# Patient Record
Sex: FEMALE | Race: WHITE | Employment: OTHER | URBAN - NONMETROPOLITAN AREA
[De-identification: names, ages, dates, MRNs, and addresses within clinical notes are randomized per-mention and may not be internally consistent; named-entity substitution may affect disease eponyms.]

---

## 2017-01-17 ENCOUNTER — TELEPHONE (OUTPATIENT)
Dept: NEUROSURGERY | Age: 82
End: 2017-01-17

## 2017-01-25 ENCOUNTER — TELEPHONE (OUTPATIENT)
Dept: NEUROSURGERY | Age: 82
End: 2017-01-25

## 2017-02-07 ENCOUNTER — OFFICE VISIT (OUTPATIENT)
Dept: NEUROSURGERY | Age: 82
End: 2017-02-07
Payer: MEDICARE

## 2017-02-07 VITALS
BODY MASS INDEX: 24.99 KG/M2 | SYSTOLIC BLOOD PRESSURE: 146 MMHG | HEIGHT: 65 IN | DIASTOLIC BLOOD PRESSURE: 70 MMHG | WEIGHT: 150 LBS | HEART RATE: 72 BPM | OXYGEN SATURATION: 100 %

## 2017-02-07 DIAGNOSIS — Z98.890 S/P KYPHOPLASTY: ICD-10-CM

## 2017-02-07 DIAGNOSIS — M54.6 ACUTE MIDLINE THORACIC BACK PAIN: ICD-10-CM

## 2017-02-07 DIAGNOSIS — M25.551 PAIN OF RIGHT HIP JOINT: Primary | ICD-10-CM

## 2017-02-07 PROCEDURE — 99212 OFFICE O/P EST SF 10 MIN: CPT | Performed by: PHYSICIAN ASSISTANT

## 2017-02-07 RX ORDER — VITAMINS A AND D
CAPSULE ORAL
COMMUNITY
Start: 2017-01-17 | End: 2017-06-27

## 2017-02-07 RX ORDER — MELOXICAM 7.5 MG/1
7.5 TABLET ORAL DAILY
Qty: 90 TABLET | Refills: 3 | Status: SHIPPED | OUTPATIENT
Start: 2017-02-07 | End: 2017-05-25

## 2017-04-21 ENCOUNTER — HOSPITAL ENCOUNTER (OUTPATIENT)
Age: 82
Setting detail: OUTPATIENT SURGERY
Discharge: HOME | End: 2017-04-21
Attending: ORTHOPAEDIC SURGERY | Admitting: ORTHOPAEDIC SURGERY

## 2017-04-21 ENCOUNTER — HOSPITAL ENCOUNTER (OUTPATIENT)
Dept: GENERAL RADIOLOGY | Age: 82
Discharge: HOME | End: 2017-04-21
Payer: MEDICARE

## 2017-04-21 VITALS
SYSTOLIC BLOOD PRESSURE: 180 MMHG | RESPIRATION RATE: 16 BRPM | HEIGHT: 60 IN | WEIGHT: 158 LBS | BODY MASS INDEX: 31.02 KG/M2 | OXYGEN SATURATION: 100 % | DIASTOLIC BLOOD PRESSURE: 74 MMHG | HEART RATE: 83 BPM

## 2017-04-21 DIAGNOSIS — R52 PAIN: ICD-10-CM

## 2017-04-21 PROBLEM — M16.11 PRIMARY OSTEOARTHRITIS OF RIGHT HIP: Status: ACTIVE | Noted: 2017-04-21

## 2017-04-21 PROCEDURE — G8918 PT W/O PREOP ORDER IV AB PRO: HCPCS

## 2017-04-21 PROCEDURE — G8907 PT DOC NO EVENTS ON DISCHARG: HCPCS

## 2017-04-21 PROCEDURE — 3209999900 FLUORO FOR SURGICAL PROCEDURES

## 2017-04-21 PROCEDURE — 20610 DRAIN/INJ JOINT/BURSA W/O US: CPT

## 2017-04-21 RX ORDER — LIDOCAINE HYDROCHLORIDE 10 MG/ML
INJECTION, SOLUTION EPIDURAL; INFILTRATION; INTRACAUDAL; PERINEURAL PRN
Status: DISCONTINUED | OUTPATIENT
Start: 2017-04-21 | End: 2017-04-21 | Stop reason: HOSPADM

## 2017-04-21 RX ORDER — BETAMETHASONE SODIUM PHOSPHATE AND BETAMETHASONE ACETATE 3; 3 MG/ML; MG/ML
INJECTION, SUSPENSION INTRA-ARTICULAR; INTRALESIONAL; INTRAMUSCULAR; SOFT TISSUE PRN
Status: DISCONTINUED | OUTPATIENT
Start: 2017-04-21 | End: 2017-04-21 | Stop reason: HOSPADM

## 2017-04-21 RX ORDER — BUPIVACAINE HYDROCHLORIDE 5 MG/ML
INJECTION, SOLUTION EPIDURAL; INTRACAUDAL PRN
Status: DISCONTINUED | OUTPATIENT
Start: 2017-04-21 | End: 2017-04-21 | Stop reason: HOSPADM

## 2017-05-25 ENCOUNTER — OFFICE VISIT (OUTPATIENT)
Dept: VASCULAR SURGERY | Age: 82
End: 2017-05-25
Payer: MEDICARE

## 2017-05-25 VITALS
RESPIRATION RATE: 18 BRPM | HEART RATE: 65 BPM | SYSTOLIC BLOOD PRESSURE: 169 MMHG | DIASTOLIC BLOOD PRESSURE: 84 MMHG | WEIGHT: 150 LBS | BODY MASS INDEX: 28.32 KG/M2 | HEIGHT: 61 IN | TEMPERATURE: 97.3 F

## 2017-05-25 DIAGNOSIS — L03.116 CELLULITIS OF LEFT LOWER EXTREMITY: ICD-10-CM

## 2017-05-25 DIAGNOSIS — M79.89 LEG SWELLING: Primary | ICD-10-CM

## 2017-05-25 PROCEDURE — 99214 OFFICE O/P EST MOD 30 MIN: CPT | Performed by: PHYSICIAN ASSISTANT

## 2017-05-25 RX ORDER — CHLORAL HYDRATE 500 MG
3000 CAPSULE ORAL DAILY
COMMUNITY

## 2017-05-25 RX ORDER — CEPHALEXIN 250 MG/1
250 CAPSULE ORAL 3 TIMES DAILY
Qty: 30 CAPSULE | Refills: 0 | Status: SHIPPED | OUTPATIENT
Start: 2017-05-25 | End: 2017-06-13 | Stop reason: SDUPTHER

## 2017-06-13 RX ORDER — CEPHALEXIN 250 MG/1
CAPSULE ORAL
Qty: 30 CAPSULE | Refills: 0 | Status: CANCELLED | OUTPATIENT
Start: 2017-06-13

## 2017-06-13 RX ORDER — CEPHALEXIN 250 MG/1
250 CAPSULE ORAL 3 TIMES DAILY
Qty: 30 CAPSULE | Refills: 0 | Status: SHIPPED | OUTPATIENT
Start: 2017-06-13

## 2017-06-27 ENCOUNTER — APPOINTMENT (OUTPATIENT)
Dept: GENERAL RADIOLOGY | Age: 82
DRG: 470 | End: 2017-06-27
Payer: MEDICARE

## 2017-06-27 ENCOUNTER — HOSPITAL ENCOUNTER (INPATIENT)
Age: 82
LOS: 6 days | Discharge: NURSING, REHAB OR LONG-TERM CARE | DRG: 470 | End: 2017-07-03
Attending: FAMILY MEDICINE | Admitting: FAMILY MEDICINE
Payer: MEDICARE

## 2017-06-27 DIAGNOSIS — M16.11 PRIMARY OSTEOARTHRITIS OF RIGHT HIP: ICD-10-CM

## 2017-06-27 DIAGNOSIS — R52 INTRACTABLE PAIN: Primary | ICD-10-CM

## 2017-06-27 DIAGNOSIS — M25.551 PAIN OF RIGHT HIP JOINT: ICD-10-CM

## 2017-06-27 LAB
ALBUMIN SERPL-MCNC: 3.8 G/DL (ref 3.5–5.2)
ALP BLD-CCNC: 70 U/L (ref 35–104)
ALT SERPL-CCNC: 16 U/L (ref 5–33)
ANION GAP SERPL CALCULATED.3IONS-SCNC: 15 MMOL/L (ref 7–19)
AST SERPL-CCNC: 40 U/L (ref 5–32)
BILIRUB SERPL-MCNC: <0.2 MG/DL (ref 0.2–1.2)
BUN BLDV-MCNC: 13 MG/DL (ref 8–23)
CALCIUM SERPL-MCNC: 9.8 MG/DL (ref 8.8–10.2)
CHLORIDE BLD-SCNC: 101 MMOL/L (ref 98–111)
CO2: 25 MMOL/L (ref 22–29)
CREAT SERPL-MCNC: 1 MG/DL (ref 0.5–0.9)
GFR NON-AFRICAN AMERICAN: 53
GLUCOSE BLD-MCNC: 87 MG/DL (ref 74–109)
HCT VFR BLD CALC: 39.5 % (ref 37–47)
HEMOGLOBIN: 12.8 G/DL (ref 12–16)
MCH RBC QN AUTO: 28.3 PG (ref 27–31)
MCHC RBC AUTO-ENTMCNC: 32.4 G/DL (ref 33–37)
MCV RBC AUTO: 87.4 FL (ref 81–99)
PDW BLD-RTO: 19.6 % (ref 11.5–14.5)
PLATELET # BLD: 234 K/UL (ref 130–400)
PMV BLD AUTO: 9.7 FL (ref 9.4–12.3)
POTASSIUM SERPL-SCNC: 4 MMOL/L (ref 3.5–5)
RBC # BLD: 4.52 M/UL (ref 4.2–5.4)
SODIUM BLD-SCNC: 141 MMOL/L (ref 136–145)
TOTAL PROTEIN: 7.4 G/DL (ref 6.6–8.7)
WBC # BLD: 5.1 K/UL (ref 4.8–10.8)

## 2017-06-27 PROCEDURE — 73502 X-RAY EXAM HIP UNI 2-3 VIEWS: CPT

## 2017-06-27 PROCEDURE — 99285 EMERGENCY DEPT VISIT HI MDM: CPT

## 2017-06-27 PROCEDURE — 85027 COMPLETE CBC AUTOMATED: CPT

## 2017-06-27 PROCEDURE — 93005 ELECTROCARDIOGRAM TRACING: CPT

## 2017-06-27 PROCEDURE — 1210000000 HC MED SURG R&B

## 2017-06-27 PROCEDURE — 36415 COLL VENOUS BLD VENIPUNCTURE: CPT

## 2017-06-27 PROCEDURE — 80053 COMPREHEN METABOLIC PANEL: CPT

## 2017-06-27 PROCEDURE — 6370000000 HC RX 637 (ALT 250 FOR IP): Performed by: NURSE PRACTITIONER

## 2017-06-27 RX ORDER — FERROUS SULFATE 325(65) MG
325 TABLET ORAL
Status: DISCONTINUED | OUTPATIENT
Start: 2017-06-28 | End: 2017-07-03 | Stop reason: HOSPADM

## 2017-06-27 RX ORDER — DIAZEPAM 5 MG/1
5 TABLET ORAL EVERY 8 HOURS PRN
Status: DISCONTINUED | OUTPATIENT
Start: 2017-06-27 | End: 2017-06-28 | Stop reason: SDUPTHER

## 2017-06-27 RX ORDER — TRAMADOL HYDROCHLORIDE 50 MG/1
50 TABLET ORAL 2 TIMES DAILY PRN
Status: ON HOLD | COMMUNITY
End: 2017-07-03

## 2017-06-27 RX ORDER — OMEGA-3 FATTY ACIDS CAP DELAYED RELEASE 1000 MG 1000 MG
3000 CAPSULE DELAYED RELEASE ORAL 3 TIMES DAILY
Status: DISCONTINUED | OUTPATIENT
Start: 2017-06-27 | End: 2017-06-29

## 2017-06-27 RX ORDER — FERROUS SULFATE 325(65) MG
325 TABLET ORAL
COMMUNITY

## 2017-06-27 RX ORDER — CYANOCOBALAMIN (VITAMIN B-12) 1000 MCG
1 TABLET, EXTENDED RELEASE ORAL DAILY
Status: DISCONTINUED | OUTPATIENT
Start: 2017-06-28 | End: 2017-07-03 | Stop reason: HOSPADM

## 2017-06-27 RX ORDER — HYDROCODONE BITARTRATE AND ACETAMINOPHEN 5; 325 MG/1; MG/1
1 TABLET ORAL ONCE
Status: COMPLETED | OUTPATIENT
Start: 2017-06-27 | End: 2017-06-27

## 2017-06-27 RX ORDER — TRAMADOL HYDROCHLORIDE 50 MG/1
50 TABLET ORAL 2 TIMES DAILY PRN
Status: DISCONTINUED | OUTPATIENT
Start: 2017-06-27 | End: 2017-06-29

## 2017-06-27 RX ADMIN — HYDROCODONE BITARTRATE AND ACETAMINOPHEN 1 TABLET: 5; 325 TABLET ORAL at 19:57

## 2017-06-27 ASSESSMENT — ENCOUNTER SYMPTOMS: RESPIRATORY NEGATIVE: 1

## 2017-06-27 ASSESSMENT — PAIN SCALES - GENERAL
PAINLEVEL_OUTOF10: 9
PAINLEVEL_OUTOF10: 5

## 2017-06-28 ENCOUNTER — ANESTHESIA (OUTPATIENT)
Dept: OPERATING ROOM | Age: 82
DRG: 470 | End: 2017-06-28
Payer: MEDICARE

## 2017-06-28 ENCOUNTER — ANESTHESIA EVENT (OUTPATIENT)
Dept: OPERATING ROOM | Age: 82
DRG: 470 | End: 2017-06-28
Payer: MEDICARE

## 2017-06-28 ENCOUNTER — APPOINTMENT (OUTPATIENT)
Dept: GENERAL RADIOLOGY | Age: 82
DRG: 470 | End: 2017-06-28
Payer: MEDICARE

## 2017-06-28 VITALS
TEMPERATURE: 95.9 F | DIASTOLIC BLOOD PRESSURE: 50 MMHG | SYSTOLIC BLOOD PRESSURE: 123 MMHG | RESPIRATION RATE: 13 BRPM | OXYGEN SATURATION: 100 %

## 2017-06-28 LAB
ANION GAP SERPL CALCULATED.3IONS-SCNC: 10 MMOL/L (ref 7–19)
BUN BLDV-MCNC: 13 MG/DL (ref 8–23)
CALCIUM SERPL-MCNC: 9.6 MG/DL (ref 8.8–10.2)
CHLORIDE BLD-SCNC: 105 MMOL/L (ref 98–111)
CO2: 28 MMOL/L (ref 22–29)
CREAT SERPL-MCNC: 1.1 MG/DL (ref 0.5–0.9)
EKG P AXIS: 65 DEGREES
EKG P-R INTERVAL: 182 MS
EKG Q-T INTERVAL: 430 MS
EKG QRS DURATION: 82 MS
EKG QTC CALCULATION (BAZETT): 435 MS
EKG T AXIS: 13 DEGREES
GFR NON-AFRICAN AMERICAN: 47
GLUCOSE BLD-MCNC: 92 MG/DL (ref 74–109)
HCT VFR BLD CALC: 38.9 % (ref 37–47)
HEMOGLOBIN: 12.6 G/DL (ref 12–16)
INR BLD: 1.01 (ref 0.88–1.18)
MCH RBC QN AUTO: 28.4 PG (ref 27–31)
MCHC RBC AUTO-ENTMCNC: 32.4 G/DL (ref 33–37)
MCV RBC AUTO: 87.8 FL (ref 81–99)
PDW BLD-RTO: 19.7 % (ref 11.5–14.5)
PLATELET # BLD: 223 K/UL (ref 130–400)
PMV BLD AUTO: 9.9 FL (ref 9.4–12.3)
POTASSIUM SERPL-SCNC: 4 MMOL/L (ref 3.5–5)
PROTHROMBIN TIME: 13.2 SEC (ref 12–14.6)
RBC # BLD: 4.43 M/UL (ref 4.2–5.4)
SODIUM BLD-SCNC: 143 MMOL/L (ref 136–145)
WBC # BLD: 4.8 K/UL (ref 4.8–10.8)

## 2017-06-28 PROCEDURE — 2720000003 HC MISC SUTURE/STAPLES/RELOADS/ETC: Performed by: ORTHOPAEDIC SURGERY

## 2017-06-28 PROCEDURE — 85610 PROTHROMBIN TIME: CPT

## 2017-06-28 PROCEDURE — 2500000003 HC RX 250 WO HCPCS: Performed by: ORTHOPAEDIC SURGERY

## 2017-06-28 PROCEDURE — 2580000003 HC RX 258: Performed by: NURSE ANESTHETIST, CERTIFIED REGISTERED

## 2017-06-28 PROCEDURE — 2580000003 HC RX 258: Performed by: ORTHOPAEDIC SURGERY

## 2017-06-28 PROCEDURE — 6370000000 HC RX 637 (ALT 250 FOR IP): Performed by: ORTHOPAEDIC SURGERY

## 2017-06-28 PROCEDURE — 6360000002 HC RX W HCPCS: Performed by: NURSE ANESTHETIST, CERTIFIED REGISTERED

## 2017-06-28 PROCEDURE — 73502 X-RAY EXAM HIP UNI 2-3 VIEWS: CPT

## 2017-06-28 PROCEDURE — 2500000003 HC RX 250 WO HCPCS: Performed by: NURSE ANESTHETIST, CERTIFIED REGISTERED

## 2017-06-28 PROCEDURE — 72170 X-RAY EXAM OF PELVIS: CPT

## 2017-06-28 PROCEDURE — 0SR9039 REPLACEMENT OF RIGHT HIP JOINT WITH CERAMIC SYNTHETIC SUBSTITUTE, CEMENTED, OPEN APPROACH: ICD-10-PCS | Performed by: ORTHOPAEDIC SURGERY

## 2017-06-28 PROCEDURE — C1713 ANCHOR/SCREW BN/BN,TIS/BN: HCPCS | Performed by: ORTHOPAEDIC SURGERY

## 2017-06-28 PROCEDURE — 1210000000 HC MED SURG R&B

## 2017-06-28 PROCEDURE — 94664 DEMO&/EVAL PT USE INHALER: CPT

## 2017-06-28 PROCEDURE — 3700000000 HC ANESTHESIA ATTENDED CARE: Performed by: ORTHOPAEDIC SURGERY

## 2017-06-28 PROCEDURE — 6360000002 HC RX W HCPCS: Performed by: ORTHOPAEDIC SURGERY

## 2017-06-28 PROCEDURE — 3600000015 HC SURGERY LEVEL 5 ADDTL 15MIN: Performed by: ORTHOPAEDIC SURGERY

## 2017-06-28 PROCEDURE — 2700000000 HC OXYGEN THERAPY PER DAY

## 2017-06-28 PROCEDURE — 80048 BASIC METABOLIC PNL TOTAL CA: CPT

## 2017-06-28 PROCEDURE — C1776 JOINT DEVICE (IMPLANTABLE): HCPCS | Performed by: ORTHOPAEDIC SURGERY

## 2017-06-28 PROCEDURE — C1729 CATH, DRAINAGE: HCPCS | Performed by: ORTHOPAEDIC SURGERY

## 2017-06-28 PROCEDURE — 85027 COMPLETE CBC AUTOMATED: CPT

## 2017-06-28 PROCEDURE — 93005 ELECTROCARDIOGRAM TRACING: CPT

## 2017-06-28 PROCEDURE — 7100000001 HC PACU RECOVERY - ADDTL 15 MIN: Performed by: ORTHOPAEDIC SURGERY

## 2017-06-28 PROCEDURE — 7100000000 HC PACU RECOVERY - FIRST 15 MIN: Performed by: ORTHOPAEDIC SURGERY

## 2017-06-28 PROCEDURE — 3700000001 HC ADD 15 MINUTES (ANESTHESIA): Performed by: ORTHOPAEDIC SURGERY

## 2017-06-28 PROCEDURE — 36415 COLL VENOUS BLD VENIPUNCTURE: CPT

## 2017-06-28 PROCEDURE — C9290 INJ, BUPIVACAINE LIPOSOME: HCPCS | Performed by: ORTHOPAEDIC SURGERY

## 2017-06-28 PROCEDURE — 3209999900 FLUORO FOR SURGICAL PROCEDURES

## 2017-06-28 PROCEDURE — 2720000001 HC MISC SURG SUPPLY STERILE $51-500: Performed by: ORTHOPAEDIC SURGERY

## 2017-06-28 PROCEDURE — 99232 SBSQ HOSP IP/OBS MODERATE 35: CPT | Performed by: HOSPITALIST

## 2017-06-28 PROCEDURE — 3600000005 HC SURGERY LEVEL 5 BASE: Performed by: ORTHOPAEDIC SURGERY

## 2017-06-28 PROCEDURE — 6370000000 HC RX 637 (ALT 250 FOR IP): Performed by: CLINICAL NURSE SPECIALIST

## 2017-06-28 DEVICE — CENTRALIZER STEM SZ 5 L120MM DIA13MM DST FEM HIP CEM MOLD: Type: IMPLANTABLE DEVICE | Site: HIP | Status: FUNCTIONAL

## 2017-06-28 DEVICE — LINER ACET OD56MM ID36MM HIP ALTRX PINN: Type: IMPLANTABLE DEVICE | Site: HIP | Status: FUNCTIONAL

## 2017-06-28 DEVICE — IMPLANTABLE DEVICE: Type: IMPLANTABLE DEVICE | Site: HIP | Status: FUNCTIONAL

## 2017-06-28 DEVICE — SCREW BNE L45MM DIA6.5MM CANC HIP S STL GRIPTION FULL THRD: Type: IMPLANTABLE DEVICE | Site: HIP | Status: FUNCTIONAL

## 2017-06-28 DEVICE — SCREW BNE L30MM DIA6.5MM CANC HIP S STL GRIPTION FULL THRD: Type: IMPLANTABLE DEVICE | Site: HIP | Status: FUNCTIONAL

## 2017-06-28 DEVICE — DISCONTINUED USE 415964 CEMENT PALACOS R + G SING DOSE 40GR: Type: IMPLANTABLE DEVICE | Site: HIP | Status: FUNCTIONAL

## 2017-06-28 DEVICE — CEMENT BNE 20ML 40GM HALF DOSE PMMA W/O GENT HI VISC RADPQ: Type: IMPLANTABLE DEVICE | Site: HIP | Status: FUNCTIONAL

## 2017-06-28 DEVICE — CUP ACET DIA56MM 12 H HIP TI GRIPTION MULT H II VIP TAPR: Type: IMPLANTABLE DEVICE | Site: HIP | Status: FUNCTIONAL

## 2017-06-28 DEVICE — HEAD FEM DIA36MM +1.5MM OFFSET 12/14 TAPR HIP MTL M-SPEC: Type: IMPLANTABLE DEVICE | Site: HIP | Status: FUNCTIONAL

## 2017-06-28 RX ORDER — ONDANSETRON 2 MG/ML
INJECTION INTRAMUSCULAR; INTRAVENOUS PRN
Status: DISCONTINUED | OUTPATIENT
Start: 2017-06-28 | End: 2017-06-28 | Stop reason: SDUPTHER

## 2017-06-28 RX ORDER — MORPHINE SULFATE 4 MG/ML
4 INJECTION, SOLUTION INTRAMUSCULAR; INTRAVENOUS EVERY 5 MIN PRN
Status: DISCONTINUED | OUTPATIENT
Start: 2017-06-28 | End: 2017-06-28 | Stop reason: HOSPADM

## 2017-06-28 RX ORDER — ONDANSETRON 2 MG/ML
4 INJECTION INTRAMUSCULAR; INTRAVENOUS EVERY 6 HOURS PRN
Status: DISCONTINUED | OUTPATIENT
Start: 2017-06-28 | End: 2017-07-03 | Stop reason: HOSPADM

## 2017-06-28 RX ORDER — DOCUSATE SODIUM 100 MG/1
100 CAPSULE, LIQUID FILLED ORAL 2 TIMES DAILY
Status: DISCONTINUED | OUTPATIENT
Start: 2017-06-28 | End: 2017-07-03 | Stop reason: HOSPADM

## 2017-06-28 RX ORDER — SODIUM CHLORIDE 0.9 % (FLUSH) 0.9 %
10 SYRINGE (ML) INJECTION PRN
Status: DISCONTINUED | OUTPATIENT
Start: 2017-06-28 | End: 2017-06-28 | Stop reason: SDUPTHER

## 2017-06-28 RX ORDER — SODIUM CHLORIDE 9 MG/ML
INJECTION, SOLUTION INTRAVENOUS CONTINUOUS
Status: DISCONTINUED | OUTPATIENT
Start: 2017-06-28 | End: 2017-06-28 | Stop reason: SDUPTHER

## 2017-06-28 RX ORDER — OXYCODONE HYDROCHLORIDE 5 MG/1
10 TABLET ORAL EVERY 4 HOURS PRN
Status: DISCONTINUED | OUTPATIENT
Start: 2017-06-28 | End: 2017-07-03 | Stop reason: HOSPADM

## 2017-06-28 RX ORDER — LABETALOL HYDROCHLORIDE 5 MG/ML
5 INJECTION, SOLUTION INTRAVENOUS EVERY 10 MIN PRN
Status: DISCONTINUED | OUTPATIENT
Start: 2017-06-28 | End: 2017-06-28 | Stop reason: HOSPADM

## 2017-06-28 RX ORDER — CEFAZOLIN SODIUM 1 G/3ML
INJECTION, POWDER, FOR SOLUTION INTRAMUSCULAR; INTRAVENOUS PRN
Status: DISCONTINUED | OUTPATIENT
Start: 2017-06-28 | End: 2017-06-28 | Stop reason: SDUPTHER

## 2017-06-28 RX ORDER — HYDRALAZINE HYDROCHLORIDE 20 MG/ML
5 INJECTION INTRAMUSCULAR; INTRAVENOUS EVERY 10 MIN PRN
Status: DISCONTINUED | OUTPATIENT
Start: 2017-06-28 | End: 2017-06-28 | Stop reason: HOSPADM

## 2017-06-28 RX ORDER — ROCURONIUM BROMIDE 10 MG/ML
INJECTION, SOLUTION INTRAVENOUS PRN
Status: DISCONTINUED | OUTPATIENT
Start: 2017-06-28 | End: 2017-06-28 | Stop reason: SDUPTHER

## 2017-06-28 RX ORDER — DIPHENHYDRAMINE HYDROCHLORIDE 50 MG/ML
12.5 INJECTION INTRAMUSCULAR; INTRAVENOUS
Status: DISCONTINUED | OUTPATIENT
Start: 2017-06-28 | End: 2017-06-28 | Stop reason: HOSPADM

## 2017-06-28 RX ORDER — EPHEDRINE SULFATE 50 MG/ML
INJECTION, SOLUTION INTRAVENOUS PRN
Status: DISCONTINUED | OUTPATIENT
Start: 2017-06-28 | End: 2017-06-28 | Stop reason: SDUPTHER

## 2017-06-28 RX ORDER — MORPHINE SULFATE 4 MG/ML
2 INJECTION, SOLUTION INTRAMUSCULAR; INTRAVENOUS EVERY 5 MIN PRN
Status: DISCONTINUED | OUTPATIENT
Start: 2017-06-28 | End: 2017-06-28 | Stop reason: HOSPADM

## 2017-06-28 RX ORDER — ENALAPRILAT 2.5 MG/2ML
1.25 INJECTION INTRAVENOUS
Status: DISCONTINUED | OUTPATIENT
Start: 2017-06-28 | End: 2017-06-28 | Stop reason: HOSPADM

## 2017-06-28 RX ORDER — SODIUM CHLORIDE, SODIUM LACTATE, POTASSIUM CHLORIDE, CALCIUM CHLORIDE 600; 310; 30; 20 MG/100ML; MG/100ML; MG/100ML; MG/100ML
INJECTION, SOLUTION INTRAVENOUS CONTINUOUS PRN
Status: DISCONTINUED | OUTPATIENT
Start: 2017-06-28 | End: 2017-06-28 | Stop reason: SDUPTHER

## 2017-06-28 RX ORDER — LIDOCAINE HYDROCHLORIDE 10 MG/ML
INJECTION, SOLUTION INFILTRATION; PERINEURAL PRN
Status: DISCONTINUED | OUTPATIENT
Start: 2017-06-28 | End: 2017-06-28 | Stop reason: SDUPTHER

## 2017-06-28 RX ORDER — CLINDAMYCIN PHOSPHATE 900 MG/50ML
900 INJECTION INTRAVENOUS EVERY 8 HOURS
Status: DISCONTINUED | OUTPATIENT
Start: 2017-06-28 | End: 2017-06-29

## 2017-06-28 RX ORDER — DIPHENHYDRAMINE HCL 25 MG
25 TABLET ORAL EVERY 6 HOURS PRN
Status: DISCONTINUED | OUTPATIENT
Start: 2017-06-28 | End: 2017-07-03 | Stop reason: HOSPADM

## 2017-06-28 RX ORDER — TRAMADOL HYDROCHLORIDE 50 MG/1
50 TABLET ORAL EVERY 6 HOURS
Status: DISCONTINUED | OUTPATIENT
Start: 2017-06-28 | End: 2017-06-29

## 2017-06-28 RX ORDER — DOCUSATE SODIUM 100 MG/1
100 CAPSULE, LIQUID FILLED ORAL 2 TIMES DAILY
Status: DISCONTINUED | OUTPATIENT
Start: 2017-06-28 | End: 2017-06-28 | Stop reason: SDUPTHER

## 2017-06-28 RX ORDER — ACETAMINOPHEN 500 MG
1000 TABLET ORAL EVERY 8 HOURS
Status: DISCONTINUED | OUTPATIENT
Start: 2017-06-28 | End: 2017-07-03 | Stop reason: HOSPADM

## 2017-06-28 RX ORDER — METOCLOPRAMIDE HYDROCHLORIDE 5 MG/ML
10 INJECTION INTRAMUSCULAR; INTRAVENOUS
Status: DISCONTINUED | OUTPATIENT
Start: 2017-06-28 | End: 2017-06-28 | Stop reason: HOSPADM

## 2017-06-28 RX ORDER — OXYCODONE HYDROCHLORIDE 5 MG/1
5 TABLET ORAL EVERY 4 HOURS PRN
Status: DISCONTINUED | OUTPATIENT
Start: 2017-06-28 | End: 2017-07-03 | Stop reason: HOSPADM

## 2017-06-28 RX ORDER — PROPOFOL 10 MG/ML
INJECTION, EMULSION INTRAVENOUS PRN
Status: DISCONTINUED | OUTPATIENT
Start: 2017-06-28 | End: 2017-06-28 | Stop reason: SDUPTHER

## 2017-06-28 RX ORDER — DIAZEPAM 5 MG/1
5 TABLET ORAL EVERY 6 HOURS PRN
Status: DISCONTINUED | OUTPATIENT
Start: 2017-06-28 | End: 2017-07-03 | Stop reason: HOSPADM

## 2017-06-28 RX ORDER — SODIUM CHLORIDE 0.9 % (FLUSH) 0.9 %
10 SYRINGE (ML) INJECTION EVERY 12 HOURS SCHEDULED
Status: DISCONTINUED | OUTPATIENT
Start: 2017-06-28 | End: 2017-07-03 | Stop reason: HOSPADM

## 2017-06-28 RX ORDER — DEXAMETHASONE SODIUM PHOSPHATE 4 MG/ML
INJECTION, SOLUTION INTRA-ARTICULAR; INTRALESIONAL; INTRAMUSCULAR; INTRAVENOUS; SOFT TISSUE PRN
Status: DISCONTINUED | OUTPATIENT
Start: 2017-06-28 | End: 2017-06-28 | Stop reason: SDUPTHER

## 2017-06-28 RX ORDER — PROMETHAZINE HYDROCHLORIDE 25 MG/ML
6.25 INJECTION, SOLUTION INTRAMUSCULAR; INTRAVENOUS
Status: DISCONTINUED | OUTPATIENT
Start: 2017-06-28 | End: 2017-06-28 | Stop reason: HOSPADM

## 2017-06-28 RX ORDER — FAMOTIDINE 20 MG/1
20 TABLET, FILM COATED ORAL 2 TIMES DAILY
Status: DISCONTINUED | OUTPATIENT
Start: 2017-06-28 | End: 2017-06-30 | Stop reason: DRUGHIGH

## 2017-06-28 RX ORDER — FENTANYL CITRATE 50 UG/ML
INJECTION, SOLUTION INTRAMUSCULAR; INTRAVENOUS PRN
Status: DISCONTINUED | OUTPATIENT
Start: 2017-06-28 | End: 2017-06-28 | Stop reason: SDUPTHER

## 2017-06-28 RX ORDER — SODIUM CHLORIDE 9 MG/ML
INJECTION, SOLUTION INTRAVENOUS CONTINUOUS
Status: DISCONTINUED | OUTPATIENT
Start: 2017-06-28 | End: 2017-07-03 | Stop reason: HOSPADM

## 2017-06-28 RX ORDER — MEPERIDINE HYDROCHLORIDE 50 MG/ML
12.5 INJECTION INTRAMUSCULAR; INTRAVENOUS; SUBCUTANEOUS EVERY 5 MIN PRN
Status: DISCONTINUED | OUTPATIENT
Start: 2017-06-28 | End: 2017-06-28 | Stop reason: HOSPADM

## 2017-06-28 RX ORDER — SODIUM CHLORIDE 0.9 % (FLUSH) 0.9 %
10 SYRINGE (ML) INJECTION PRN
Status: DISCONTINUED | OUTPATIENT
Start: 2017-06-28 | End: 2017-07-03 | Stop reason: HOSPADM

## 2017-06-28 RX ORDER — ONDANSETRON 2 MG/ML
4 INJECTION INTRAMUSCULAR; INTRAVENOUS EVERY 6 HOURS PRN
Status: DISCONTINUED | OUTPATIENT
Start: 2017-06-28 | End: 2017-06-28 | Stop reason: SDUPTHER

## 2017-06-28 RX ORDER — 0.9 % SODIUM CHLORIDE 0.9 %
500 INTRAVENOUS SOLUTION INTRAVENOUS PRN
Status: DISCONTINUED | OUTPATIENT
Start: 2017-06-28 | End: 2017-07-03 | Stop reason: HOSPADM

## 2017-06-28 RX ORDER — ACETAMINOPHEN 325 MG/1
650 TABLET ORAL EVERY 4 HOURS PRN
Status: DISCONTINUED | OUTPATIENT
Start: 2017-06-28 | End: 2017-07-03 | Stop reason: HOSPADM

## 2017-06-28 RX ORDER — TRANEXAMIC ACID 100 MG/ML
INJECTION, SOLUTION INTRAVENOUS PRN
Status: DISCONTINUED | OUTPATIENT
Start: 2017-06-28 | End: 2017-06-28 | Stop reason: SDUPTHER

## 2017-06-28 RX ORDER — SODIUM CHLORIDE 0.9 % (FLUSH) 0.9 %
10 SYRINGE (ML) INJECTION EVERY 12 HOURS SCHEDULED
Status: DISCONTINUED | OUTPATIENT
Start: 2017-06-28 | End: 2017-06-28 | Stop reason: SDUPTHER

## 2017-06-28 RX ADMIN — SODIUM CHLORIDE, SODIUM LACTATE, POTASSIUM CHLORIDE, AND CALCIUM CHLORIDE: 600; 310; 30; 20 INJECTION, SOLUTION INTRAVENOUS at 14:51

## 2017-06-28 RX ADMIN — EPHEDRINE SULFATE 5 MG: 50 INJECTION, SOLUTION INTRAMUSCULAR; INTRAVENOUS; SUBCUTANEOUS at 15:23

## 2017-06-28 RX ADMIN — TRAMADOL HYDROCHLORIDE 50 MG: 50 TABLET, FILM COATED ORAL at 21:28

## 2017-06-28 RX ADMIN — CLINDAMYCIN PHOSPHATE 900 MG: 18 INJECTION, SOLUTION INTRAVENOUS at 21:30

## 2017-06-28 RX ADMIN — PROPOFOL 130 MG: 10 INJECTION, EMULSION INTRAVENOUS at 14:59

## 2017-06-28 RX ADMIN — FENTANYL CITRATE 50 MCG: 50 INJECTION INTRAMUSCULAR; INTRAVENOUS at 15:30

## 2017-06-28 RX ADMIN — SODIUM CHLORIDE, SODIUM LACTATE, POTASSIUM CHLORIDE, AND CALCIUM CHLORIDE: 600; 310; 30; 20 INJECTION, SOLUTION INTRAVENOUS at 17:07

## 2017-06-28 RX ADMIN — ONDANSETRON HYDROCHLORIDE 4 MG: 2 INJECTION, SOLUTION INTRAVENOUS at 16:36

## 2017-06-28 RX ADMIN — TRANEXAMIC ACID 1000 MG: 100 INJECTION, SOLUTION INTRAVENOUS at 16:49

## 2017-06-28 RX ADMIN — MORPHINE SULFATE 4 MG: 4 INJECTION, SOLUTION INTRAMUSCULAR; INTRAVENOUS at 17:53

## 2017-06-28 RX ADMIN — HYDROMORPHONE HYDROCHLORIDE 0.5 MG: 1 INJECTION, SOLUTION INTRAMUSCULAR; INTRAVENOUS; SUBCUTANEOUS at 16:26

## 2017-06-28 RX ADMIN — EPHEDRINE SULFATE 10 MG: 50 INJECTION, SOLUTION INTRAMUSCULAR; INTRAVENOUS; SUBCUTANEOUS at 15:17

## 2017-06-28 RX ADMIN — FENTANYL CITRATE 50 MCG: 50 INJECTION INTRAMUSCULAR; INTRAVENOUS at 14:52

## 2017-06-28 RX ADMIN — EPHEDRINE SULFATE 35 MG: 50 INJECTION, SOLUTION INTRAMUSCULAR; INTRAVENOUS; SUBCUTANEOUS at 16:30

## 2017-06-28 RX ADMIN — TRANEXAMIC ACID 1000 MG: 100 INJECTION, SOLUTION INTRAVENOUS at 15:19

## 2017-06-28 RX ADMIN — FENTANYL CITRATE 100 MCG: 50 INJECTION INTRAMUSCULAR; INTRAVENOUS at 14:58

## 2017-06-28 RX ADMIN — Medication 2 G: at 22:45

## 2017-06-28 RX ADMIN — FAMOTIDINE 20 MG: 20 TABLET ORAL at 22:45

## 2017-06-28 RX ADMIN — SUGAMMADEX 130 MG: 100 INJECTION, SOLUTION INTRAVENOUS at 16:48

## 2017-06-28 RX ADMIN — LIDOCAINE HYDROCHLORIDE 5 ML: 10 INJECTION, SOLUTION INFILTRATION; PERINEURAL at 14:59

## 2017-06-28 RX ADMIN — DOCUSATE SODIUM 100 MG: 100 CAPSULE, LIQUID FILLED ORAL at 21:28

## 2017-06-28 RX ADMIN — SODIUM CHLORIDE: 9 INJECTION, SOLUTION INTRAVENOUS at 21:30

## 2017-06-28 RX ADMIN — CEFAZOLIN 2000 MG: 1 INJECTION, POWDER, FOR SOLUTION INTRAVENOUS at 15:10

## 2017-06-28 RX ADMIN — ONDANSETRON 4 MG: 2 INJECTION INTRAMUSCULAR; INTRAVENOUS at 20:25

## 2017-06-28 RX ADMIN — ACETAMINOPHEN 1000 MG: 500 TABLET ORAL at 21:29

## 2017-06-28 RX ADMIN — ROCURONIUM BROMIDE 50 MG: 10 INJECTION INTRAVENOUS at 14:59

## 2017-06-28 RX ADMIN — HYDROMORPHONE HYDROCHLORIDE 0.5 MG: 1 INJECTION, SOLUTION INTRAMUSCULAR; INTRAVENOUS; SUBCUTANEOUS at 16:14

## 2017-06-28 RX ADMIN — DEXAMETHASONE SODIUM PHOSPHATE 4 MG: 4 INJECTION, SOLUTION INTRAMUSCULAR; INTRAVENOUS at 15:20

## 2017-06-28 ASSESSMENT — ENCOUNTER SYMPTOMS
BACK PAIN: 1
RESPIRATORY NEGATIVE: 1
EYES NEGATIVE: 1
GASTROINTESTINAL NEGATIVE: 1

## 2017-06-28 ASSESSMENT — PAIN SCALES - GENERAL: PAINLEVEL_OUTOF10: 5

## 2017-06-29 LAB
ANION GAP SERPL CALCULATED.3IONS-SCNC: 16 MMOL/L (ref 7–19)
BUN BLDV-MCNC: 13 MG/DL (ref 8–23)
CALCIUM SERPL-MCNC: 8.6 MG/DL (ref 8.8–10.2)
CHLORIDE BLD-SCNC: 101 MMOL/L (ref 98–111)
CO2: 20 MMOL/L (ref 22–29)
CREAT SERPL-MCNC: 1 MG/DL (ref 0.5–0.9)
GFR NON-AFRICAN AMERICAN: 53
GLUCOSE BLD-MCNC: 195 MG/DL (ref 74–109)
HCT VFR BLD CALC: 36.2 % (ref 37–47)
HEMOGLOBIN: 11.4 G/DL (ref 12–16)
POTASSIUM SERPL-SCNC: 4.1 MMOL/L (ref 3.5–5)
SODIUM BLD-SCNC: 137 MMOL/L (ref 136–145)

## 2017-06-29 PROCEDURE — 97162 PT EVAL MOD COMPLEX 30 MIN: CPT

## 2017-06-29 PROCEDURE — G8978 MOBILITY CURRENT STATUS: HCPCS

## 2017-06-29 PROCEDURE — 2500000003 HC RX 250 WO HCPCS: Performed by: ORTHOPAEDIC SURGERY

## 2017-06-29 PROCEDURE — 36415 COLL VENOUS BLD VENIPUNCTURE: CPT

## 2017-06-29 PROCEDURE — 6370000000 HC RX 637 (ALT 250 FOR IP): Performed by: CLINICAL NURSE SPECIALIST

## 2017-06-29 PROCEDURE — 6360000002 HC RX W HCPCS: Performed by: ORTHOPAEDIC SURGERY

## 2017-06-29 PROCEDURE — 1210000000 HC MED SURG R&B

## 2017-06-29 PROCEDURE — G8988 SELF CARE GOAL STATUS: HCPCS

## 2017-06-29 PROCEDURE — 97166 OT EVAL MOD COMPLEX 45 MIN: CPT

## 2017-06-29 PROCEDURE — 6370000000 HC RX 637 (ALT 250 FOR IP): Performed by: ORTHOPAEDIC SURGERY

## 2017-06-29 PROCEDURE — 99233 SBSQ HOSP IP/OBS HIGH 50: CPT | Performed by: HOSPITALIST

## 2017-06-29 PROCEDURE — 85018 HEMOGLOBIN: CPT

## 2017-06-29 PROCEDURE — G8979 MOBILITY GOAL STATUS: HCPCS

## 2017-06-29 PROCEDURE — 80048 BASIC METABOLIC PNL TOTAL CA: CPT

## 2017-06-29 PROCEDURE — 6370000000 HC RX 637 (ALT 250 FOR IP): Performed by: HOSPITALIST

## 2017-06-29 PROCEDURE — 97535 SELF CARE MNGMENT TRAINING: CPT

## 2017-06-29 PROCEDURE — 85014 HEMATOCRIT: CPT

## 2017-06-29 PROCEDURE — 97530 THERAPEUTIC ACTIVITIES: CPT

## 2017-06-29 PROCEDURE — 97116 GAIT TRAINING THERAPY: CPT

## 2017-06-29 PROCEDURE — G8987 SELF CARE CURRENT STATUS: HCPCS

## 2017-06-29 PROCEDURE — 2700000000 HC OXYGEN THERAPY PER DAY

## 2017-06-29 RX ORDER — OMEGA-3 FATTY ACIDS CAP DELAYED RELEASE 1000 MG 1000 MG
3000 CAPSULE DELAYED RELEASE ORAL DAILY
Status: DISCONTINUED | OUTPATIENT
Start: 2017-06-30 | End: 2017-07-03 | Stop reason: HOSPADM

## 2017-06-29 RX ORDER — CLINDAMYCIN HYDROCHLORIDE 300 MG/1
600 CAPSULE ORAL EVERY 6 HOURS SCHEDULED
Status: COMPLETED | OUTPATIENT
Start: 2017-06-29 | End: 2017-06-30

## 2017-06-29 RX ORDER — SACCHAROMYCES BOULARDII 250 MG
250 CAPSULE ORAL 2 TIMES DAILY
Status: DISCONTINUED | OUTPATIENT
Start: 2017-06-29 | End: 2017-07-03 | Stop reason: HOSPADM

## 2017-06-29 RX ORDER — CLINDAMYCIN HYDROCHLORIDE 300 MG/1
600 CAPSULE ORAL EVERY 6 HOURS SCHEDULED
Status: DISCONTINUED | OUTPATIENT
Start: 2017-06-29 | End: 2017-06-29

## 2017-06-29 RX ADMIN — TRAMADOL HYDROCHLORIDE 50 MG: 50 TABLET, FILM COATED ORAL at 02:21

## 2017-06-29 RX ADMIN — CLINDAMYCIN PHOSPHATE 900 MG: 18 INJECTION, SOLUTION INTRAVENOUS at 02:20

## 2017-06-29 RX ADMIN — CLINDAMYCIN PHOSPHATE 900 MG: 18 INJECTION, SOLUTION INTRAVENOUS at 11:42

## 2017-06-29 RX ADMIN — TRAMADOL HYDROCHLORIDE 50 MG: 50 TABLET, FILM COATED ORAL at 06:07

## 2017-06-29 RX ADMIN — ACETAMINOPHEN 1000 MG: 500 TABLET ORAL at 11:42

## 2017-06-29 RX ADMIN — FERROUS SULFATE TAB 325 MG (65 MG ELEMENTAL FE) 325 MG: 325 (65 FE) TAB at 08:56

## 2017-06-29 RX ADMIN — Medication 250 MG: at 21:12

## 2017-06-29 RX ADMIN — CLINDAMYCIN HYDROCHLORIDE 600 MG: 300 CAPSULE ORAL at 17:37

## 2017-06-29 RX ADMIN — OXYCODONE HYDROCHLORIDE 5 MG: 5 TABLET ORAL at 16:12

## 2017-06-29 RX ADMIN — RIVAROXABAN 10 MG: 10 TABLET, FILM COATED ORAL at 17:37

## 2017-06-29 RX ADMIN — FAMOTIDINE 20 MG: 20 TABLET ORAL at 08:56

## 2017-06-29 RX ADMIN — Medication 1 TABLET: at 08:57

## 2017-06-29 RX ADMIN — Medication 2 G: at 06:06

## 2017-06-29 RX ADMIN — OMEGA-3 FATTY ACIDS CAP DELAYED RELEASE 1000 MG 3000 MG: 1000 CAPSULE DELAYED RELEASE at 08:56

## 2017-06-29 RX ADMIN — DOCUSATE SODIUM 100 MG: 100 CAPSULE, LIQUID FILLED ORAL at 08:57

## 2017-06-29 RX ADMIN — ACETAMINOPHEN 1000 MG: 500 TABLET ORAL at 17:37

## 2017-06-29 RX ADMIN — DOCUSATE SODIUM 100 MG: 100 CAPSULE, LIQUID FILLED ORAL at 21:12

## 2017-06-29 RX ADMIN — FAMOTIDINE 20 MG: 20 TABLET ORAL at 21:12

## 2017-06-29 RX ADMIN — ACETAMINOPHEN 1000 MG: 500 TABLET ORAL at 02:20

## 2017-06-29 RX ADMIN — RIVAROXABAN 10 MG: 10 TABLET, FILM COATED ORAL at 02:20

## 2017-06-29 ASSESSMENT — PAIN SCALES - GENERAL
PAINLEVEL_OUTOF10: 2
PAINLEVEL_OUTOF10: 1
PAINLEVEL_OUTOF10: 1
PAINLEVEL_OUTOF10: 5
PAINLEVEL_OUTOF10: 3
PAINLEVEL_OUTOF10: 4
PAINLEVEL_OUTOF10: 4

## 2017-06-29 ASSESSMENT — PAIN DESCRIPTION - LOCATION
LOCATION: HIP

## 2017-06-29 ASSESSMENT — PAIN DESCRIPTION - PAIN TYPE
TYPE: SURGICAL PAIN

## 2017-06-29 ASSESSMENT — PAIN DESCRIPTION - ORIENTATION
ORIENTATION: RIGHT

## 2017-06-29 ASSESSMENT — PAIN DESCRIPTION - FREQUENCY
FREQUENCY: INTERMITTENT
FREQUENCY: INTERMITTENT

## 2017-06-29 ASSESSMENT — PAIN SCALES - WONG BAKER
WONGBAKER_NUMERICALRESPONSE: 4
WONGBAKER_NUMERICALRESPONSE: 4

## 2017-06-29 ASSESSMENT — PAIN DESCRIPTION - DESCRIPTORS
DESCRIPTORS: ACHING
DESCRIPTORS: ACHING

## 2017-06-30 LAB
ANION GAP SERPL CALCULATED.3IONS-SCNC: 12 MMOL/L (ref 7–19)
BUN BLDV-MCNC: 14 MG/DL (ref 8–23)
CALCIUM SERPL-MCNC: 9.2 MG/DL (ref 8.8–10.2)
CHLORIDE BLD-SCNC: 101 MMOL/L (ref 98–111)
CO2: 24 MMOL/L (ref 22–29)
CREAT SERPL-MCNC: 0.9 MG/DL (ref 0.5–0.9)
GFR NON-AFRICAN AMERICAN: 59
GLUCOSE BLD-MCNC: 106 MG/DL (ref 74–109)
HCT VFR BLD CALC: 32.9 % (ref 37–47)
HEMOGLOBIN: 10.7 G/DL (ref 12–16)
MCH RBC QN AUTO: 28.8 PG (ref 27–31)
MCHC RBC AUTO-ENTMCNC: 32.5 G/DL (ref 33–37)
MCV RBC AUTO: 88.4 FL (ref 81–99)
PDW BLD-RTO: 20.2 % (ref 11.5–14.5)
PLATELET # BLD: 211 K/UL (ref 130–400)
PMV BLD AUTO: 10.8 FL (ref 9.4–12.3)
POTASSIUM SERPL-SCNC: 4.1 MMOL/L (ref 3.5–5)
RBC # BLD: 3.72 M/UL (ref 4.2–5.4)
SODIUM BLD-SCNC: 137 MMOL/L (ref 136–145)
WBC # BLD: 11.5 K/UL (ref 4.8–10.8)

## 2017-06-30 PROCEDURE — 6370000000 HC RX 637 (ALT 250 FOR IP): Performed by: HOSPITALIST

## 2017-06-30 PROCEDURE — 97535 SELF CARE MNGMENT TRAINING: CPT

## 2017-06-30 PROCEDURE — 36415 COLL VENOUS BLD VENIPUNCTURE: CPT

## 2017-06-30 PROCEDURE — 80048 BASIC METABOLIC PNL TOTAL CA: CPT

## 2017-06-30 PROCEDURE — 85027 COMPLETE CBC AUTOMATED: CPT

## 2017-06-30 PROCEDURE — 6370000000 HC RX 637 (ALT 250 FOR IP): Performed by: ORTHOPAEDIC SURGERY

## 2017-06-30 PROCEDURE — 99233 SBSQ HOSP IP/OBS HIGH 50: CPT | Performed by: HOSPITALIST

## 2017-06-30 PROCEDURE — 1210000000 HC MED SURG R&B

## 2017-06-30 PROCEDURE — 6360000002 HC RX W HCPCS: Performed by: ORTHOPAEDIC SURGERY

## 2017-06-30 PROCEDURE — 97116 GAIT TRAINING THERAPY: CPT

## 2017-06-30 PROCEDURE — 6370000000 HC RX 637 (ALT 250 FOR IP): Performed by: CLINICAL NURSE SPECIALIST

## 2017-06-30 RX ORDER — FAMOTIDINE 20 MG/1
20 TABLET, FILM COATED ORAL DAILY
Status: DISCONTINUED | OUTPATIENT
Start: 2017-07-01 | End: 2017-07-03 | Stop reason: HOSPADM

## 2017-06-30 RX ADMIN — RIVAROXABAN 10 MG: 10 TABLET, FILM COATED ORAL at 17:48

## 2017-06-30 RX ADMIN — CLINDAMYCIN HYDROCHLORIDE 600 MG: 300 CAPSULE ORAL at 13:13

## 2017-06-30 RX ADMIN — DOCUSATE SODIUM 100 MG: 100 CAPSULE, LIQUID FILLED ORAL at 21:09

## 2017-06-30 RX ADMIN — Medication 250 MG: at 09:16

## 2017-06-30 RX ADMIN — ACETAMINOPHEN 1000 MG: 500 TABLET ORAL at 17:57

## 2017-06-30 RX ADMIN — DOCUSATE SODIUM 100 MG: 100 CAPSULE, LIQUID FILLED ORAL at 09:17

## 2017-06-30 RX ADMIN — OXYCODONE HYDROCHLORIDE 10 MG: 5 TABLET ORAL at 02:38

## 2017-06-30 RX ADMIN — CLINDAMYCIN HYDROCHLORIDE 600 MG: 300 CAPSULE ORAL at 09:16

## 2017-06-30 RX ADMIN — ONDANSETRON 4 MG: 2 INJECTION INTRAMUSCULAR; INTRAVENOUS at 01:31

## 2017-06-30 RX ADMIN — Medication 250 MG: at 21:08

## 2017-06-30 RX ADMIN — CLINDAMYCIN HYDROCHLORIDE 600 MG: 300 CAPSULE ORAL at 01:13

## 2017-06-30 RX ADMIN — FERROUS SULFATE TAB 325 MG (65 MG ELEMENTAL FE) 325 MG: 325 (65 FE) TAB at 09:15

## 2017-06-30 RX ADMIN — OXYCODONE HYDROCHLORIDE 10 MG: 5 TABLET ORAL at 09:15

## 2017-06-30 RX ADMIN — DIAZEPAM 5 MG: 5 TABLET ORAL at 03:19

## 2017-06-30 RX ADMIN — OXYCODONE HYDROCHLORIDE 10 MG: 5 TABLET ORAL at 17:47

## 2017-06-30 RX ADMIN — ACETAMINOPHEN 1000 MG: 500 TABLET ORAL at 11:22

## 2017-06-30 RX ADMIN — DIAZEPAM 2.5 MG: 5 TABLET ORAL at 21:09

## 2017-06-30 RX ADMIN — FAMOTIDINE 20 MG: 20 TABLET ORAL at 09:17

## 2017-06-30 RX ADMIN — OXYCODONE HYDROCHLORIDE 10 MG: 5 TABLET ORAL at 13:13

## 2017-06-30 ASSESSMENT — PAIN SCALES - GENERAL
PAINLEVEL_OUTOF10: 2
PAINLEVEL_OUTOF10: 5
PAINLEVEL_OUTOF10: 2
PAINLEVEL_OUTOF10: 5
PAINLEVEL_OUTOF10: 7
PAINLEVEL_OUTOF10: 7
PAINLEVEL_OUTOF10: 3
PAINLEVEL_OUTOF10: 5
PAINLEVEL_OUTOF10: 2
PAINLEVEL_OUTOF10: 5

## 2017-06-30 ASSESSMENT — PAIN DESCRIPTION - ORIENTATION: ORIENTATION: RIGHT

## 2017-06-30 ASSESSMENT — PAIN DESCRIPTION - LOCATION: LOCATION: HIP

## 2017-06-30 ASSESSMENT — PAIN SCALES - WONG BAKER: WONGBAKER_NUMERICALRESPONSE: 6

## 2017-06-30 ASSESSMENT — PAIN DESCRIPTION - PAIN TYPE: TYPE: SURGICAL PAIN

## 2017-07-01 PROBLEM — R13.10 ODYNOPHAGIA: Status: ACTIVE | Noted: 2017-07-01

## 2017-07-01 LAB
ANION GAP SERPL CALCULATED.3IONS-SCNC: 11 MMOL/L (ref 7–19)
BUN BLDV-MCNC: 12 MG/DL (ref 8–23)
CALCIUM SERPL-MCNC: 8.7 MG/DL (ref 8.8–10.2)
CHLORIDE BLD-SCNC: 99 MMOL/L (ref 98–111)
CO2: 25 MMOL/L (ref 22–29)
CREAT SERPL-MCNC: 0.8 MG/DL (ref 0.5–0.9)
GFR NON-AFRICAN AMERICAN: >60
GLUCOSE BLD-MCNC: 105 MG/DL (ref 74–109)
GLUCOSE BLD-MCNC: 118 MG/DL (ref 70–99)
HCT VFR BLD CALC: 29.6 % (ref 37–47)
HEMOGLOBIN: 9.6 G/DL (ref 12–16)
MCH RBC QN AUTO: 28.7 PG (ref 27–31)
MCHC RBC AUTO-ENTMCNC: 32.4 G/DL (ref 33–37)
MCV RBC AUTO: 88.4 FL (ref 81–99)
PDW BLD-RTO: 20.5 % (ref 11.5–14.5)
PERFORMED ON: ABNORMAL
PLATELET # BLD: 207 K/UL (ref 130–400)
PMV BLD AUTO: 10.5 FL (ref 9.4–12.3)
POTASSIUM SERPL-SCNC: 4 MMOL/L (ref 3.5–5)
RBC # BLD: 3.35 M/UL (ref 4.2–5.4)
SODIUM BLD-SCNC: 135 MMOL/L (ref 136–145)
WBC # BLD: 9.6 K/UL (ref 4.8–10.8)

## 2017-07-01 PROCEDURE — 6370000000 HC RX 637 (ALT 250 FOR IP): Performed by: ORTHOPAEDIC SURGERY

## 2017-07-01 PROCEDURE — 6370000000 HC RX 637 (ALT 250 FOR IP): Performed by: HOSPITALIST

## 2017-07-01 PROCEDURE — 85027 COMPLETE CBC AUTOMATED: CPT

## 2017-07-01 PROCEDURE — 99233 SBSQ HOSP IP/OBS HIGH 50: CPT | Performed by: HOSPITALIST

## 2017-07-01 PROCEDURE — 6360000002 HC RX W HCPCS: Performed by: HOSPITALIST

## 2017-07-01 PROCEDURE — 80048 BASIC METABOLIC PNL TOTAL CA: CPT

## 2017-07-01 PROCEDURE — 82948 REAGENT STRIP/BLOOD GLUCOSE: CPT

## 2017-07-01 PROCEDURE — 6370000000 HC RX 637 (ALT 250 FOR IP): Performed by: CLINICAL NURSE SPECIALIST

## 2017-07-01 PROCEDURE — 1210000000 HC MED SURG R&B

## 2017-07-01 PROCEDURE — 36415 COLL VENOUS BLD VENIPUNCTURE: CPT

## 2017-07-01 RX ORDER — SUCRALFATE ORAL 1 G/10ML
1 SUSPENSION ORAL
Status: DISCONTINUED | OUTPATIENT
Start: 2017-07-01 | End: 2017-07-03 | Stop reason: HOSPADM

## 2017-07-01 RX ORDER — PROMETHAZINE HYDROCHLORIDE 25 MG/1
25 TABLET ORAL EVERY 4 HOURS PRN
Status: DISCONTINUED | OUTPATIENT
Start: 2017-07-01 | End: 2017-07-03 | Stop reason: HOSPADM

## 2017-07-01 RX ORDER — PANTOPRAZOLE SODIUM 40 MG/1
40 TABLET, DELAYED RELEASE ORAL
Status: DISCONTINUED | OUTPATIENT
Start: 2017-07-01 | End: 2017-07-03 | Stop reason: HOSPADM

## 2017-07-01 RX ADMIN — OXYCODONE HYDROCHLORIDE 10 MG: 5 TABLET ORAL at 07:05

## 2017-07-01 RX ADMIN — RIVAROXABAN 10 MG: 10 TABLET, FILM COATED ORAL at 17:27

## 2017-07-01 RX ADMIN — DOCUSATE SODIUM 100 MG: 100 CAPSULE, LIQUID FILLED ORAL at 19:56

## 2017-07-01 RX ADMIN — NYSTATIN 500000 UNITS: 100000 SUSPENSION ORAL at 19:57

## 2017-07-01 RX ADMIN — SUCRALFATE 1 G: 1 SUSPENSION ORAL at 19:56

## 2017-07-01 RX ADMIN — PROMETHAZINE HYDROCHLORIDE 25 MG: 25 TABLET ORAL at 15:44

## 2017-07-01 RX ADMIN — OXYCODONE HYDROCHLORIDE 10 MG: 5 TABLET ORAL at 01:53

## 2017-07-01 RX ADMIN — ACETAMINOPHEN 1000 MG: 500 TABLET ORAL at 19:57

## 2017-07-01 RX ADMIN — FERROUS SULFATE TAB 325 MG (65 MG ELEMENTAL FE) 325 MG: 325 (65 FE) TAB at 10:07

## 2017-07-01 RX ADMIN — Medication 1 TABLET: at 10:08

## 2017-07-01 RX ADMIN — Medication 250 MG: at 10:06

## 2017-07-01 RX ADMIN — Medication 250 MG: at 19:56

## 2017-07-01 RX ADMIN — FAMOTIDINE 20 MG: 20 TABLET ORAL at 10:07

## 2017-07-01 RX ADMIN — DOCUSATE SODIUM 100 MG: 100 CAPSULE, LIQUID FILLED ORAL at 10:07

## 2017-07-01 RX ADMIN — PROMETHAZINE HYDROCHLORIDE 25 MG: 25 TABLET ORAL at 20:20

## 2017-07-01 RX ADMIN — PANTOPRAZOLE SODIUM 40 MG: 40 TABLET, DELAYED RELEASE ORAL at 14:27

## 2017-07-01 RX ADMIN — NYSTATIN 500000 UNITS: 100000 SUSPENSION ORAL at 14:27

## 2017-07-01 RX ADMIN — ACETAMINOPHEN 1000 MG: 500 TABLET ORAL at 10:07

## 2017-07-01 RX ADMIN — SUCRALFATE 1 G: 1 SUSPENSION ORAL at 14:27

## 2017-07-01 RX ADMIN — OMEGA-3 FATTY ACIDS CAP DELAYED RELEASE 1000 MG 3000 MG: 1000 CAPSULE DELAYED RELEASE at 10:06

## 2017-07-01 ASSESSMENT — PAIN SCALES - GENERAL
PAINLEVEL_OUTOF10: 3
PAINLEVEL_OUTOF10: 0
PAINLEVEL_OUTOF10: 2
PAINLEVEL_OUTOF10: 0

## 2017-07-02 LAB
HCT VFR BLD CALC: 33.5 % (ref 37–47)
HEMOGLOBIN: 10.7 G/DL (ref 12–16)
MCH RBC QN AUTO: 28.6 PG (ref 27–31)
MCHC RBC AUTO-ENTMCNC: 31.9 G/DL (ref 33–37)
MCV RBC AUTO: 89.6 FL (ref 81–99)
PDW BLD-RTO: 20.6 % (ref 11.5–14.5)
PLATELET # BLD: 259 K/UL (ref 130–400)
PMV BLD AUTO: 11.1 FL (ref 9.4–12.3)
RBC # BLD: 3.74 M/UL (ref 4.2–5.4)
WBC # BLD: 8.9 K/UL (ref 4.8–10.8)

## 2017-07-02 PROCEDURE — 99232 SBSQ HOSP IP/OBS MODERATE 35: CPT | Performed by: HOSPITALIST

## 2017-07-02 PROCEDURE — 6370000000 HC RX 637 (ALT 250 FOR IP): Performed by: ORTHOPAEDIC SURGERY

## 2017-07-02 PROCEDURE — 6370000000 HC RX 637 (ALT 250 FOR IP): Performed by: HOSPITALIST

## 2017-07-02 PROCEDURE — 97530 THERAPEUTIC ACTIVITIES: CPT

## 2017-07-02 PROCEDURE — 85027 COMPLETE CBC AUTOMATED: CPT

## 2017-07-02 PROCEDURE — 6370000000 HC RX 637 (ALT 250 FOR IP): Performed by: CLINICAL NURSE SPECIALIST

## 2017-07-02 PROCEDURE — 1210000000 HC MED SURG R&B

## 2017-07-02 PROCEDURE — 36415 COLL VENOUS BLD VENIPUNCTURE: CPT

## 2017-07-02 PROCEDURE — 2580000003 HC RX 258: Performed by: ORTHOPAEDIC SURGERY

## 2017-07-02 PROCEDURE — 97116 GAIT TRAINING THERAPY: CPT

## 2017-07-02 RX ADMIN — OXYCODONE HYDROCHLORIDE 5 MG: 5 TABLET ORAL at 10:03

## 2017-07-02 RX ADMIN — Medication 10 ML: at 20:30

## 2017-07-02 RX ADMIN — OXYCODONE HYDROCHLORIDE 5 MG: 5 TABLET ORAL at 20:31

## 2017-07-02 RX ADMIN — NYSTATIN 500000 UNITS: 100000 SUSPENSION ORAL at 13:58

## 2017-07-02 RX ADMIN — NYSTATIN 500000 UNITS: 100000 SUSPENSION ORAL at 20:32

## 2017-07-02 RX ADMIN — ACETAMINOPHEN 1000 MG: 500 TABLET ORAL at 03:22

## 2017-07-02 RX ADMIN — SUCRALFATE 1 G: 1 SUSPENSION ORAL at 20:30

## 2017-07-02 RX ADMIN — DIAZEPAM 2.5 MG: 5 TABLET ORAL at 23:08

## 2017-07-02 RX ADMIN — SUCRALFATE 1 G: 1 SUSPENSION ORAL at 17:24

## 2017-07-02 RX ADMIN — PANTOPRAZOLE SODIUM 40 MG: 40 TABLET, DELAYED RELEASE ORAL at 17:24

## 2017-07-02 RX ADMIN — MAGNESIUM HYDROXIDE 30 ML: 400 SUSPENSION ORAL at 08:33

## 2017-07-02 RX ADMIN — NYSTATIN 500000 UNITS: 100000 SUSPENSION ORAL at 17:24

## 2017-07-02 RX ADMIN — DIAZEPAM 2.5 MG: 5 TABLET ORAL at 20:30

## 2017-07-02 RX ADMIN — RIVAROXABAN 10 MG: 10 TABLET, FILM COATED ORAL at 17:24

## 2017-07-02 RX ADMIN — SUCRALFATE 1 G: 1 SUSPENSION ORAL at 06:27

## 2017-07-02 ASSESSMENT — PAIN DESCRIPTION - LOCATION: LOCATION: HIP

## 2017-07-02 ASSESSMENT — PAIN SCALES - GENERAL
PAINLEVEL_OUTOF10: 1
PAINLEVEL_OUTOF10: 5
PAINLEVEL_OUTOF10: 0
PAINLEVEL_OUTOF10: 2
PAINLEVEL_OUTOF10: 4

## 2017-07-02 ASSESSMENT — PAIN DESCRIPTION - ORIENTATION: ORIENTATION: RIGHT

## 2017-07-02 ASSESSMENT — PAIN DESCRIPTION - PAIN TYPE: TYPE: SURGICAL PAIN

## 2017-07-03 VITALS
BODY MASS INDEX: 28.51 KG/M2 | WEIGHT: 145.2 LBS | OXYGEN SATURATION: 97 % | HEART RATE: 80 BPM | TEMPERATURE: 98.2 F | HEIGHT: 60 IN | RESPIRATION RATE: 18 BRPM | SYSTOLIC BLOOD PRESSURE: 106 MMHG | DIASTOLIC BLOOD PRESSURE: 58 MMHG

## 2017-07-03 PROCEDURE — 6370000000 HC RX 637 (ALT 250 FOR IP): Performed by: ORTHOPAEDIC SURGERY

## 2017-07-03 PROCEDURE — 6370000000 HC RX 637 (ALT 250 FOR IP): Performed by: HOSPITALIST

## 2017-07-03 PROCEDURE — 97530 THERAPEUTIC ACTIVITIES: CPT

## 2017-07-03 PROCEDURE — 6370000000 HC RX 637 (ALT 250 FOR IP): Performed by: CLINICAL NURSE SPECIALIST

## 2017-07-03 PROCEDURE — 97116 GAIT TRAINING THERAPY: CPT

## 2017-07-03 PROCEDURE — 99239 HOSP IP/OBS DSCHRG MGMT >30: CPT | Performed by: HOSPITALIST

## 2017-07-03 RX ORDER — TRAMADOL HYDROCHLORIDE 50 MG/1
50 TABLET ORAL 2 TIMES DAILY PRN
Qty: 20 TABLET | Refills: 0 | Status: SHIPPED | OUTPATIENT
Start: 2017-07-03

## 2017-07-03 RX ORDER — DIAZEPAM 5 MG/1
5 TABLET ORAL EVERY 6 HOURS PRN
Qty: 20 TABLET | Refills: 0 | Status: SHIPPED | OUTPATIENT
Start: 2017-07-03

## 2017-07-03 RX ORDER — OXYCODONE HYDROCHLORIDE 5 MG/1
5 TABLET ORAL EVERY 6 HOURS PRN
Status: CANCELLED | DISCHARGE
Start: 2017-07-03 | End: 2017-07-10

## 2017-07-03 RX ORDER — SUCRALFATE ORAL 1 G/10ML
1 SUSPENSION ORAL
Qty: 1200 ML | Refills: 3 | DISCHARGE
Start: 2017-07-03

## 2017-07-03 RX ORDER — OXYCODONE HYDROCHLORIDE 10 MG/1
10 TABLET ORAL EVERY 4 HOURS PRN
Qty: 20 TABLET | Refills: 0 | Status: SHIPPED | OUTPATIENT
Start: 2017-07-03 | End: 2017-07-10

## 2017-07-03 RX ORDER — SACCHAROMYCES BOULARDII 250 MG
250 CAPSULE ORAL 2 TIMES DAILY
DISCHARGE
Start: 2017-07-03 | End: 2017-07-10

## 2017-07-03 RX ORDER — PANTOPRAZOLE SODIUM 40 MG/1
40 TABLET, DELAYED RELEASE ORAL
Qty: 30 TABLET | Refills: 3 | DISCHARGE
Start: 2017-07-03

## 2017-07-03 RX ORDER — PSEUDOEPHEDRINE HCL 30 MG
100 TABLET ORAL 2 TIMES DAILY
Qty: 60 CAPSULE | Refills: 0 | Status: SHIPPED | OUTPATIENT
Start: 2017-07-03

## 2017-07-03 RX ORDER — FAMOTIDINE 20 MG/1
20 TABLET, FILM COATED ORAL DAILY
Qty: 60 TABLET | Refills: 3 | DISCHARGE
Start: 2017-07-03

## 2017-07-03 RX ADMIN — OXYCODONE HYDROCHLORIDE 10 MG: 5 TABLET ORAL at 17:08

## 2017-07-03 RX ADMIN — FAMOTIDINE 20 MG: 20 TABLET ORAL at 08:02

## 2017-07-03 RX ADMIN — DOCUSATE SODIUM 100 MG: 100 CAPSULE, LIQUID FILLED ORAL at 08:01

## 2017-07-03 RX ADMIN — SUCRALFATE 1 G: 1 SUSPENSION ORAL at 05:37

## 2017-07-03 RX ADMIN — SUCRALFATE 1 G: 1 SUSPENSION ORAL at 17:08

## 2017-07-03 RX ADMIN — PANTOPRAZOLE SODIUM 40 MG: 40 TABLET, DELAYED RELEASE ORAL at 17:08

## 2017-07-03 RX ADMIN — OMEGA-3 FATTY ACIDS CAP DELAYED RELEASE 1000 MG 3000 MG: 1000 CAPSULE DELAYED RELEASE at 08:01

## 2017-07-03 RX ADMIN — Medication 1 TABLET: at 08:02

## 2017-07-03 RX ADMIN — OXYCODONE HYDROCHLORIDE 10 MG: 5 TABLET ORAL at 10:58

## 2017-07-03 RX ADMIN — ACETAMINOPHEN 1000 MG: 500 TABLET ORAL at 10:51

## 2017-07-03 RX ADMIN — Medication 250 MG: at 08:02

## 2017-07-03 RX ADMIN — PANTOPRAZOLE SODIUM 40 MG: 40 TABLET, DELAYED RELEASE ORAL at 05:37

## 2017-07-03 RX ADMIN — SUCRALFATE 1 G: 1 SUSPENSION ORAL at 10:51

## 2017-07-03 ASSESSMENT — PAIN SCALES - GENERAL
PAINLEVEL_OUTOF10: 6
PAINLEVEL_OUTOF10: 5
PAINLEVEL_OUTOF10: 7
PAINLEVEL_OUTOF10: 4
PAINLEVEL_OUTOF10: 5
PAINLEVEL_OUTOF10: 2
PAINLEVEL_OUTOF10: 1

## 2020-09-16 ENCOUNTER — HOSPITAL ENCOUNTER (OUTPATIENT)
Facility: HOSPITAL | Age: 85
Setting detail: OBSERVATION
Discharge: HOME | End: 2020-09-17
Attending: INTERNAL MEDICINE | Admitting: INTERNAL MEDICINE

## 2020-09-16 DIAGNOSIS — N13.2 URETERAL STONE WITH HYDRONEPHROSIS: Primary | ICD-10-CM

## 2020-09-16 PROBLEM — A49.9 BACTERIAL UTI: Status: ACTIVE | Noted: 2020-09-16

## 2020-09-16 PROBLEM — N81.4 UTERINE PROLAPSE: Status: ACTIVE | Noted: 2020-09-16

## 2020-09-16 PROBLEM — N39.0 UTI (URINARY TRACT INFECTION): Status: ACTIVE | Noted: 2020-09-16

## 2020-09-16 LAB
ALBUMIN SERPL-MCNC: 3.9 G/DL (ref 3.5–5.2)
ALBUMIN/GLOB SERPL: 1.3 G/DL
ALP SERPL-CCNC: 71 U/L (ref 39–117)
ALT SERPL W P-5'-P-CCNC: 10 U/L (ref 1–33)
ANION GAP SERPL CALCULATED.3IONS-SCNC: 10 MMOL/L (ref 5–15)
AST SERPL-CCNC: 33 U/L (ref 1–32)
BACTERIA UR QL AUTO: ABNORMAL /HPF
BASOPHILS # BLD AUTO: 0.04 10*3/MM3 (ref 0–0.2)
BASOPHILS NFR BLD AUTO: 0.5 % (ref 0–1.5)
BILIRUB SERPL-MCNC: 0.3 MG/DL (ref 0–1.2)
BILIRUB UR QL STRIP: NEGATIVE
BUN SERPL-MCNC: 13 MG/DL (ref 8–23)
BUN/CREAT SERPL: 13.4 (ref 7–25)
CALCIUM SPEC-SCNC: 9.2 MG/DL (ref 8.6–10.5)
CHLORIDE SERPL-SCNC: 104 MMOL/L (ref 98–107)
CLARITY UR: ABNORMAL
CO2 SERPL-SCNC: 25 MMOL/L (ref 22–29)
COLOR UR: YELLOW
CREAT SERPL-MCNC: 0.97 MG/DL (ref 0.57–1)
DEPRECATED RDW RBC AUTO: 49.3 FL (ref 37–54)
EOSINOPHIL # BLD AUTO: 0.01 10*3/MM3 (ref 0–0.4)
EOSINOPHIL NFR BLD AUTO: 0.1 % (ref 0.3–6.2)
ERYTHROCYTE [DISTWIDTH] IN BLOOD BY AUTOMATED COUNT: 16.2 % (ref 12.3–15.4)
GFR SERPL CREATININE-BSD FRML MDRD: 54 ML/MIN/1.73
GLOBULIN UR ELPH-MCNC: 2.9 GM/DL
GLUCOSE SERPL-MCNC: 125 MG/DL (ref 65–99)
GLUCOSE UR STRIP-MCNC: NEGATIVE MG/DL
HCT VFR BLD AUTO: 33.2 % (ref 34–46.6)
HGB BLD-MCNC: 10.5 G/DL (ref 12–15.9)
HGB UR QL STRIP.AUTO: ABNORMAL
HYALINE CASTS UR QL AUTO: ABNORMAL /LPF
IMM GRANULOCYTES # BLD AUTO: 0.04 10*3/MM3 (ref 0–0.05)
IMM GRANULOCYTES NFR BLD AUTO: 0.5 % (ref 0–0.5)
KETONES UR QL STRIP: NEGATIVE
LEUKOCYTE ESTERASE UR QL STRIP.AUTO: ABNORMAL
LYMPHOCYTES # BLD AUTO: 1.24 10*3/MM3 (ref 0.7–3.1)
LYMPHOCYTES NFR BLD AUTO: 14.2 % (ref 19.6–45.3)
MAGNESIUM SERPL-MCNC: 2 MG/DL (ref 1.6–2.4)
MCH RBC QN AUTO: 26.1 PG (ref 26.6–33)
MCHC RBC AUTO-ENTMCNC: 31.6 G/DL (ref 31.5–35.7)
MCV RBC AUTO: 82.6 FL (ref 79–97)
MONOCYTES # BLD AUTO: 0.58 10*3/MM3 (ref 0.1–0.9)
MONOCYTES NFR BLD AUTO: 6.7 % (ref 5–12)
NEUTROPHILS NFR BLD AUTO: 6.81 10*3/MM3 (ref 1.7–7)
NEUTROPHILS NFR BLD AUTO: 78 % (ref 42.7–76)
NITRITE UR QL STRIP: NEGATIVE
NRBC BLD AUTO-RTO: 0 /100 WBC (ref 0–0.2)
PH UR STRIP.AUTO: 7 [PH] (ref 5–8)
PLATELET # BLD AUTO: 255 10*3/MM3 (ref 140–450)
PMV BLD AUTO: 10.7 FL (ref 6–12)
POTASSIUM SERPL-SCNC: 4.2 MMOL/L (ref 3.5–5.2)
PROCALCITONIN SERPL-MCNC: 0.08 NG/ML (ref 0–0.25)
PROT SERPL-MCNC: 6.8 G/DL (ref 6–8.5)
PROT UR QL STRIP: ABNORMAL
RBC # BLD AUTO: 4.02 10*6/MM3 (ref 3.77–5.28)
RBC # UR: ABNORMAL /HPF
REF LAB TEST METHOD: ABNORMAL
SODIUM SERPL-SCNC: 139 MMOL/L (ref 136–145)
SP GR UR STRIP: 1.01 (ref 1–1.03)
SQUAMOUS #/AREA URNS HPF: ABNORMAL /HPF
UROBILINOGEN UR QL STRIP: ABNORMAL
WBC # BLD AUTO: 8.72 10*3/MM3 (ref 3.4–10.8)
WBC UR QL AUTO: ABNORMAL /HPF
YEAST URNS QL MICRO: ABNORMAL /HPF

## 2020-09-16 PROCEDURE — 87086 URINE CULTURE/COLONY COUNT: CPT | Performed by: NURSE PRACTITIONER

## 2020-09-16 PROCEDURE — G0378 HOSPITAL OBSERVATION PER HR: HCPCS

## 2020-09-16 PROCEDURE — 81001 URINALYSIS AUTO W/SCOPE: CPT | Performed by: NURSE PRACTITIONER

## 2020-09-16 PROCEDURE — 25010000002 CEFTRIAXONE PER 250 MG: Performed by: INTERNAL MEDICINE

## 2020-09-16 PROCEDURE — 96360 HYDRATION IV INFUSION INIT: CPT

## 2020-09-16 PROCEDURE — 96361 HYDRATE IV INFUSION ADD-ON: CPT

## 2020-09-16 PROCEDURE — 83735 ASSAY OF MAGNESIUM: CPT | Performed by: INTERNAL MEDICINE

## 2020-09-16 PROCEDURE — 84145 PROCALCITONIN (PCT): CPT | Performed by: INTERNAL MEDICINE

## 2020-09-16 PROCEDURE — 85025 COMPLETE CBC W/AUTO DIFF WBC: CPT | Performed by: INTERNAL MEDICINE

## 2020-09-16 PROCEDURE — P9612 CATHETERIZE FOR URINE SPEC: HCPCS

## 2020-09-16 PROCEDURE — 80053 COMPREHEN METABOLIC PANEL: CPT | Performed by: INTERNAL MEDICINE

## 2020-09-16 PROCEDURE — 99222 1ST HOSP IP/OBS MODERATE 55: CPT | Performed by: UROLOGY

## 2020-09-16 RX ORDER — SODIUM CHLORIDE 0.9 % (FLUSH) 0.9 %
10 SYRINGE (ML) INJECTION AS NEEDED
Status: DISCONTINUED | OUTPATIENT
Start: 2020-09-16 | End: 2020-09-17 | Stop reason: HOSPADM

## 2020-09-16 RX ORDER — ONDANSETRON 2 MG/ML
4 INJECTION INTRAMUSCULAR; INTRAVENOUS EVERY 6 HOURS PRN
Status: DISCONTINUED | OUTPATIENT
Start: 2020-09-16 | End: 2020-09-17 | Stop reason: HOSPADM

## 2020-09-16 RX ORDER — SODIUM CHLORIDE 9 MG/ML
75 INJECTION, SOLUTION INTRAVENOUS CONTINUOUS
Status: DISCONTINUED | OUTPATIENT
Start: 2020-09-16 | End: 2020-09-17 | Stop reason: HOSPADM

## 2020-09-16 RX ORDER — CHLORAL HYDRATE 500 MG
1000 CAPSULE ORAL
COMMUNITY

## 2020-09-16 RX ORDER — MELATONIN
1000 DAILY
COMMUNITY

## 2020-09-16 RX ORDER — CHOLECALCIFEROL (VITAMIN D3) 125 MCG
500 CAPSULE ORAL DAILY
COMMUNITY

## 2020-09-16 RX ORDER — ACETAMINOPHEN 325 MG/1
650 TABLET ORAL EVERY 4 HOURS PRN
Status: DISCONTINUED | OUTPATIENT
Start: 2020-09-16 | End: 2020-09-17 | Stop reason: HOSPADM

## 2020-09-16 RX ORDER — PHENOL 1.4 %
600 AEROSOL, SPRAY (ML) MUCOUS MEMBRANE DAILY
COMMUNITY

## 2020-09-16 RX ORDER — SODIUM CHLORIDE 0.9 % (FLUSH) 0.9 %
10 SYRINGE (ML) INJECTION EVERY 12 HOURS SCHEDULED
Status: DISCONTINUED | OUTPATIENT
Start: 2020-09-16 | End: 2020-09-16

## 2020-09-16 RX ADMIN — SODIUM CHLORIDE, PRESERVATIVE FREE 10 ML: 5 INJECTION INTRAVENOUS at 09:54

## 2020-09-16 RX ADMIN — SODIUM CHLORIDE 75 ML/HR: 9 INJECTION, SOLUTION INTRAVENOUS at 18:46

## 2020-09-16 RX ADMIN — SODIUM CHLORIDE 75 ML/HR: 9 INJECTION, SOLUTION INTRAVENOUS at 04:26

## 2020-09-16 RX ADMIN — CEFTRIAXONE SODIUM 2 G: 2 INJECTION, POWDER, FOR SOLUTION INTRAMUSCULAR; INTRAVENOUS at 22:48

## 2020-09-16 NOTE — PROGRESS NOTES
Discharge Planning Assessment  [facility]     Patient Name: Adriana Peters  MRN: [id number]  Today's Date: 9/16/2020    Admit Date: 9/16/2020    Discharge Needs Assessment     Row Name 09/16/20 1002       Living Environment    Lives With  alone  (Pended)     Current Living Arrangements  home/apartment/condo  (Pended)     Primary Care Provided by  self  (Pended)     Provides Primary Care For  no one  (Pended)     Family Caregiver if Needed  none  (Pended)     Quality of Family Relationships  non-existent  (Pended)        Transition Planning    Patient/Family Anticipates Transition to  home  (Pended)        Discharge Needs Assessment    Equipment Currently Used at Home  none  (Pended)     Concerns to be Addressed  no discharge needs identified  (Pended)         Discharge Plan     Row Name 09/16/20 1003       Plan    Plan  Home  (Pended)     Patient/Family in Agreement with Plan  yes  (Pended)     Plan Comments  Called and spoke with pt. Pt states she currently lives alone and does not have any friends or family. No d/c needs were identified during screen. Pt has PCP/RX coverage. Will follow.  (Pended)         Continued Care and Services - Admitted Since 9/16/2020    Coordination has not been started for this encounter.         Demographic Summary    No documentation.       Functional Status    No documentation.       Psychosocial    No documentation.       Abuse/Neglect    No documentation.       Legal    No documentation.       Substance Abuse    No documentation.       Patient Forms    No documentation.           Chel Ruano

## 2020-09-16 NOTE — PROGRESS NOTES
"Reviewed chart. Imaging reviewed with radiologist Dr. Juan Antonio Jose at [facility].  Some stenosis and kinking seen well outside the pelvis, all the way up near the kidney on the left side at the [facility]J. While it's not completely obstructed, he does report there is significant stenosis at that level.    Long discussion with patient regarding GYN history.  She has had some prolapse for 15 years.  During that time, \"it hasn't changed a bit\".  No increasing pressure, no cervical excoriation seen on exam. On exam, she has a grade III UV prolapse, incomplete.    Now, significant UV prolapse (complete with cervix outside vaginal canal, past introitus), we can occasionally see urethral kinking due to it's hypermobility, but not ureteral. This generally results in urinary retention, which the patient denies. In less severe cases, like hers, we typically see urinary incontinence, which she does report.  However, as the uterus moves down (prolapse just a few centimeters in her case) it will tend to stretch, not kink the ureter.  Most common cause of stenosis and kinking at that level would either be congenital, mass, stone or some other Urologic or Renal disorder.    It's unlikely that an organ that hasn't moved in 15 years is causing any acute issues. A pessary is generally used to \"unkink\" the urethra in cases of urinary retention, and should she experience any of the above, we could certainly get her in urgently. For prolapse of this degree, the purpose of a pessary would generally be for quality of life, due to pressure or pelvic discomfort, again, not a current complaint.    We're happy to see her once she's discharged if she so chooses, but, I suspect she will not, as she very clearly stated its never caused her any problems, even discomfort in 15 years.    Astrid Loaiza MD  Obstetrics & Gynecology  9/16/2020  12:42 CDT  "

## 2020-09-16 NOTE — PAYOR COMM NOTE
"Adriana Allison (88 y.o. Female)    [id number]    Admit  9/16    [facility] phone   Fax        Date of Birth Social Security Number Address Home Phone MRN    04/19/1932  [id number] [id number] [phone] [id number]    Mandaen Marital Status          Other        Admission Date Admission Type Admitting Provider Attending Provider Department, Room/Bed    9/16/20 Urgent Terry Bush MD [place], Glenn Riego, MD [facility] 3C, 360/1    Discharge Date Discharge Disposition Discharge Destination                       Attending Provider: Terry Bush MD    Allergies: No Known Allergies    Isolation: None   Infection: None   Code Status: CPR    Ht: 160 cm (63\")   Wt: 66.1 kg (145 lb 12.8 oz)    Admission Cmt: None   Principal Problem: None                Active Insurance as of 9/16/2020     Primary Coverage     Payor Plan Insurance Group Employer/Plan Group    HUMANA MEDICARE REPLACEMENT HUMANA MEDICARE REPLACEMENT [id number]     Payor Plan Address Payor Plan Phone Number Payor Plan Fax Number Effective Dates    [address] [address] [phone]  1/1/2020 - None Entered    [facility] [id number]       Subscriber Name Subscriber Birth Date Member ID       ADRIANA ALLISON 4/19/1932 [id number]                 Emergency Contacts      (Rel.) Home Phone Work Phone Mobile Phone    JIM ALLISON (Son) [phone] -- [phone]    TOÑO ALLISON (Son) -- -- [phone]               History & Physical      Paloma Omer DO at 09/16/20 0359              [facility] Medicine Services  HISTORY AND PHYSICAL    Date of Admission: 9/16/2020  Primary Care Physician: Josefa Hassan MD    Subjective     Chief Complaint: Flank pain    History of Present Illness  88-year-old female who presents from [facility].  Dr. Head was notified and the case was discussed with " him.  The patient presented at that facility with left flank pain.  It radiated into her abdomen.  She was found to have +4 bacteria in her urine with a right hydronephrosis.  And a less than 1 mm ureteral stone.  The patient's white count was 10.4, platelets were 262.  BUN was 16 creatinine was 1.0 glucose was 119.  The patient on my exam was pain-free, and had no nausea or vomiting.  She was resting comfortably, and easily awakened.        Review of Systems   Flank pain    Otherwise complete ROS reviewed and negative except as mentioned in the HPI.    Past Medical History:   Odynophagia 2017   Primary osteoarthritis of right hip 2017   Intractable back pain 09/10/2016   Chronic pain syndrome 09/10/2016   Chronic anxiety 09/10/2016   Constipation 09/10/2016   Compression fracture     Osteoporosis     Osteoarthritis          Past Surgical History:  KYPHOSIS SURGERY 2016 N/A Kyphoplasty T12 performed by Timothy Jones DO at [facility] OR    Medical devices from this surgery are in the Implants section.     BACK SURGERY     FRACTURE REPAIR OF T12     KYPHOSIS SURGERY 2016 N/A KYPHOPLASTY of T11 performed by Timothy Jones DO at [facility] OR    Medical devices from this surgery are in the Implants section.     HC INJECT OTHER PERPHRL NERV 2017 Hip/Right INJECTION MEDICATION WITH FLUOROSCOPIC GUIDANCE performed by Que Cristobal MD at [facility] OR     TOTAL HIP ARTHROPLASTY 2017 Right HIP TOTAL ARTHROPLASTY ANT/SUPINE performed by Que Cristobal MD at [facility] OR    Medical devices from this surgery are in the Implants section.           Social History:  reports that she has never smoked. She has never used smokeless tobacco.    Family History:   Medical History Relation Name Comments   Sudden Death Father       Hypertension Mother         Relation Name Status Comments   Father        Mother                Allergies:  No Known Allergies  Medications:  Prior to Admission medications       Medication Sig Start Date End Date Taking? Authorizing Provider   calcium carbonate (OS-CLIFTON) 600 MG tablet Take 600 mg by mouth Daily.   Yes Radha Link MD   cholecalciferol (VITAMIN D3) 25 MCG (1000 UT) tablet Take 1,000 Units by mouth Daily.   Yes Radha Link MD   Omega-3 Fatty Acids (fish oil) 1000 MG capsule capsule Take 1,000 mg by mouth Daily With Breakfast.   Yes Radha Link MD   vitamin B-12 (CYANOCOBALAMIN) 500 MCG tablet Take 500 mcg by mouth Daily.   Yes Radha Link MD   vitamin E 200 UNIT capsule Take 200 Units by mouth Daily.   Yes Radha Link MD     Objective     Vital Signs: /66 (BP Location: Left arm, Patient Position: Lying)   Pulse 79   Temp 98 °F (36.7 °C) (Oral)   Resp 18   SpO2 98%   Physical Exam  Vitals signs reviewed.   Constitutional:       Appearance: Normal appearance.   HENT:      Head: Normocephalic and atraumatic.      Nose: Nose normal.   Eyes:      Conjunctiva/sclera: Conjunctivae normal.   Neck:      Musculoskeletal: Normal range of motion and neck supple.   Cardiovascular:      Rate and Rhythm: Normal rate and regular rhythm.      Heart sounds: Normal heart sounds.   Pulmonary:      Effort: Pulmonary effort is normal.      Breath sounds: Normal breath sounds.   Abdominal:      General: Bowel sounds are normal.      Palpations: Abdomen is soft.   Musculoskeletal:         General: No tenderness.   Skin:     General: Skin is warm and dry.   Neurological:      Mental Status: She is alert.      Cranial Nerves: No cranial nerve deficit.   Psychiatric:         Mood and Affect: Mood normal.         Behavior: Behavior normal.           Results Reviewed:  Lab Results (last 24 hours)     ** No results found for the last 24 hours. **        Imaging Results (Last 24 Hours)     ** No results found for the last 24 hours. **        I have personally reviewed and interpreted the radiology studies and ECG obtained at time of admission.      Assessment / Plan     Assessment:   Active Hospital Problems    Diagnosis   • UTI (urinary tract infection)     Impression:  1.  Urinary tract infection  2.  Ureteral stone  3.  Flank pain  4.  Hydronephrosis    Plan:   1.  IV fluids  2.  Rocephin   3.  Urology consult  4.  Labs in the morning      Code Status: Full     I discussed the patient's findings and my recommendations with the patient    Estimated length of stay 1 to 2 days    Patient seen and examined by me on 9/16/2020    Electronically signed by Paloma Omer DO, 09/16/20, 03:59 CDT.                Electronically signed by Paloma Omer DO at 09/16/20 0403         Current Facility-Administered Medications   Medication Dose Route Frequency Provider Last Rate Last Dose   • acetaminophen (TYLENOL) tablet 650 mg  650 mg Oral Q4H PRN Paloma Omer DO       • cefTRIAXone (ROCEPHIN) 2 g/100 mL 0.9% NS IVPB (MBP)  2 g Intravenous Q24H Paloma Omer DO       • ondansetron (ZOFRAN) injection 4 mg  4 mg Intravenous Q6H PRN Paloma Omer DO       • sodium chloride 0.9 % flush 10 mL  10 mL Intravenous Q12H Paloma Omer DO   10 mL at 09/16/20 0954   • sodium chloride 0.9 % flush 10 mL  10 mL Intravenous PRN Paloma Omer DO       • sodium chloride 0.9 % infusion  75 mL/hr Intravenous Continuous Paloma Omer DO 75 mL/hr at 09/16/20 0426 75 mL/hr at 09/16/20 0426        Physician Progress Notes (last 24 hours) (Notes from 09/15/20 1043 through 09/16/20 1043)      Sandie Hyde APRN at 09/16/20 0831              [facility] Medicine Services  INPATIENT PROGRESS NOTE    Length of Stay: 0  Date of Admission: 9/16/2020  Primary Care Physician: Josefa Hassan MD    Subjective   Chief Complaint: Left abdominal pain  HPI   Patient reported sudden onset of left-sided abdominal pain flank region with some radiation to pelvic region.  She never had symptoms with this before.  She reported  some nausea without vomiting.  She denied burning or dysuria.  No fever or chills.  Presented outside facility noted to have 4+ bacteria on urinalysis.  Less than 1 mm ureteral stone noted.  Hydronephrosis.  White blood cell count 10.4, creatinine 1.0.  Transferred for urology evaluation and treatment.    Patient is lying in bed.  She complains of dry mouth.  Reports left-sided abdominal pain with mild radiation to left groin that has improved.  Nausea has resolved.  She denies dysuria.  Was in the room with Dr. Head explained and offered patient treatment to include    Review of Systems   Constitutional: Negative for activity change, appetite change, chills, fatigue and fever.   HENT: Negative for congestion and trouble swallowing.    Eyes: Negative for photophobia and visual disturbance.   Respiratory: Negative for cough, shortness of breath and wheezing.    Cardiovascular: Negative for chest pain, palpitations and leg swelling.   Gastrointestinal: Positive for abdominal pain (Left-sided) and nausea. Negative for constipation, diarrhea and vomiting.   Endocrine: Negative for cold intolerance, heat intolerance and polyuria.   Genitourinary: Negative for dysuria.   Musculoskeletal: Negative for back pain.   Skin: Negative for color change, pallor, rash and wound.   Allergic/Immunologic: Negative for immunocompromised state.   Neurological: Positive for weakness.   Hematological: Negative for adenopathy. Does not bruise/bleed easily.   Psychiatric/Behavioral: Negative for agitation, behavioral problems and confusion.      All pertinent negatives and positives are as above. All other systems have been reviewed and are negative unless otherwise stated.     Objective    Temp:  [98 °F (36.7 °C)-99.5 °F (37.5 °C)] 99.5 °F (37.5 °C)  Heart Rate:  [68-81] 68  Resp:  [18] 18  BP: (111-123)/(48-66) 111/57  Physical Exam  Vitals signs reviewed.   Constitutional:       Appearance: Normal appearance.      Comments: No distress.   Sitting up in bed.  No oxygen in use.   HENT:      Head: Normocephalic and atraumatic.      Nose: No congestion.      Mouth/Throat:      Mouth: Mucous membranes are dry.   Eyes:      Extraocular Movements: Extraocular movements intact.      Pupils: Pupils are equal, round, and reactive to light.   Neck:      Musculoskeletal: Normal range of motion and neck supple.   Cardiovascular:      Rate and Rhythm: Normal rate and regular rhythm.      Pulses: Normal pulses.      Heart sounds: No murmur. No friction rub. No gallop.    Pulmonary:      Effort: Pulmonary effort is normal.      Breath sounds: Normal breath sounds. No wheezing, rhonchi or rales.      Comments: No oxygen in use.  Chest:      Chest wall: No tenderness.   Abdominal:      Tenderness: There is abdominal tenderness (Mild left).   Genitourinary:     Comments: Uterine prolapse   Musculoskeletal:         General: No swelling or tenderness.      Right lower leg: No edema.      Left lower leg: No edema.      Comments: SCDs bilateral lower extremities   Skin:     General: Skin is warm and dry.   Neurological:      Mental Status: She is alert and oriented to person, place, and time.   Psychiatric:         Mood and Affect: Mood normal.         Behavior: Behavior normal.         Thought Content: Thought content normal.         Judgment: Judgment normal.       Results Review:  I have reviewed the labs, radiology results, and diagnostic studies.    Laboratory Data:      Results from last 7 days   Lab Units 09/16/20  0421   WBC 10*3/mm3 8.72   HEMOGLOBIN g/dL 10.5*   HEMATOCRIT % 33.2*   PLATELETS 10*3/mm3 255        Results from last 7 days   Lab Units 09/16/20  0421   SODIUM mmol/L 139   POTASSIUM mmol/L 4.2   CHLORIDE mmol/L 104   CO2 mmol/L 25.0   BUN mg/dL 13   CREATININE mg/dL 0.97   GLUCOSE mg/dL 125*   CALCIUM mg/dL 9.2   ALT (SGPT) U/L 10     Culture Data:    No results found for: BLOODCX, URINECX, WOUNDCX, MRSACX, RESPCX, STOOLCX    Radiology Data:   Imaging  Results (Last 7 Days)     ** No results found for the last 168 hours. **          Intake/Output    Intake/Output Summary (Last 24 hours) at 9/16/2020 0846  Last data filed at 9/16/2020 0421  Gross per 24 hour   Intake --   Output 300 ml   Net -300 ml       Scheduled Meds  cefTRIAXone, 2 g, Intravenous, Q24H  sodium chloride, 10 mL, Intravenous, Q12H      I have reviewed the patient current medications.     Assessment/Plan     Active Hospital Problems    Diagnosis   • Bacterial UTI   • Ureteral stone with hydronephrosis   • Uterine prolapse     Plan:  1.  Transferred from [facility] 9/16 with complaints of sudden onset left sided abdominal pain flank region with radiation to groin.  Complained of stabbing pain.  Reported nausea without vomiting.  No fever or chills.  Denies dysuria.  Urinalysis 4+ bacteria.  CT scan hydronephrosis.  Transferred for urology evaluation.  Rocephin given prior to transfer.  2.  Discussed with Dr. Head today and was in room with him when he discussed findings with patient.  Reviewed imaging studies that show chronically dilated left kidney evidence because there is cortical thinning of the kidney.  Punctate ureteral stone not source of obstruction.  Source of his obstruction is significant pelvic prolapse causing kinking of ureter.  Recommend gynecological evaluation for pessary placement.  Discussed ureteral stenting patient wants to decide today.  Conservative management would include pessary to reduce prolapse and see if hydronephrosis resolves with outpatient imaging.  3.  Reviewed lab outside facility.  WBC 10.4, creatinine 1.0  4.  WBC 8.72, creatinine 0.97.  Continue IV fluids at 75 mL/h.  CBC, basic metabolic panel tomorrow.  Repeat urinalysis with in and out cath.  5.  Continue Rocephin until final culture available.  6.  Consult gynecology for possible pessary placement per recommendations of Dr. Head.  7.  N.p.o. after midnight for possible stent placement tomorrow.  8.   Home medications reviewed and resumed if appropriate.  9.  SCDs for deep vein thrombosis prophylaxis  10.  Tylenol for pain.  Zofran for nausea    HER-2 sons Chas and iNlson will assist with decision-making if she is unable to make decisions for herself.  The above documentation resulted from a face-to-face encounter by me Sandie RUCKER, [facility].    Discharge Planning: I expect patient to be discharged to home in 1 day.    Electronically signed by ALKA Kline, 9/16/2020, 08:54 CDT.        Electronically signed by Sandie Hyde APRN at 09/16/20 0854          Consult Notes (last 24 hours) (Notes from 09/15/20 1043 through 09/16/20 1043)      Chas Head MD at 09/16/20 0720          Consults         Referring Provider: Gualberto  Reason for Consultation: Left Hydronephrosis    Chief complaint Left abdominal Pain    Subjective .     History of present illness:  Urolithiasis  Patient complains of left abdominal pain without radiation to the groin. Onset of symptoms was abrupt starting 1 day ago with completely resolved course since that time. Patient describes the pain as stabbing, continuous and rated as severe. The patient has had no vomiting. There has been no fever or chills. The patient is not complaining of dysuria, frequency, or urgency.  Previous management of stones includes none      Review of Systems  The following systems were reviewed and negative;  eyes, ENT, respiratory, cardiovascular, integument, breast, hematologic / lymphatic, musculoskeletal, neurological, behavioral/psych, endocrine and allergies / immunologic     History  No past medical history on file.    No past surgical history on file.    No family history on file.    Social History     Socioeconomic History   • Marital status:      Spouse name: Not on file   • Number of children: Not on file   • Years of education: Not on file   • Highest education level: Not on file   Tobacco Use   • Smoking status:  Never Smoker   • Smokeless tobacco: Never Used       Current Facility-Administered Medications   Medication Dose Route Frequency Provider Last Rate Last Dose   • acetaminophen (TYLENOL) tablet 650 mg  650 mg Oral Q4H PRN Paloma Omer DO       • cefTRIAXone (ROCEPHIN) 2 g/100 mL 0.9% NS IVPB (MBP)  2 g Intravenous Q24H Paloma Omer DO       • ondansetron (ZOFRAN) injection 4 mg  4 mg Intravenous Q6H PRN Paloma Omer DO       • sodium chloride 0.9 % flush 10 mL  10 mL Intravenous Q12H Paloma Omer DO       • sodium chloride 0.9 % flush 10 mL  10 mL Intravenous PRN Paloma Omer DO       • sodium chloride 0.9 % infusion  75 mL/hr Intravenous Continuous Paloma Omer DO 75 mL/hr at 09/16/20 0426 75 mL/hr at 09/16/20 0426        No Known Allergies    Objective     Vital Signs   Temp:  [98 °F (36.7 °C)-99.5 °F (37.5 °C)] 99.5 °F (37.5 °C)  Heart Rate:  [68-81] 68  Resp:  [18] 18  BP: (111-123)/(48-66) 111/57    Intake/Output Summary (Last 24 hours) at 9/16/2020 0812  Last data filed at 9/16/2020 0421  Gross per 24 hour   Intake --   Output 300 ml   Net -300 ml       Physical Exam:     General Appearance:    Alert, cooperative, in no acute distress   Head:    Normocephalic, without obvious abnormality, atraumatic   Eyes:            Lids and lashes normal, conjunctivae and sclerae normal, no   icterus, no pallor, corneas clear, PERRLA   Ears:    Ears appear intact with no abnormalities noted   Throat:   No oral lesions, no thrush, oral mucosa moist   Neck:   No adenopathy, supple, trachea midline, no thyromegaly, no   carotid bruit, no JVD   Back:     No kyphosis present, no scoliosis present, no skin lesions,      erythema or scars, no tenderness to percussion or                   palpation,   range of motion normal   Lungs:    Respirations unlabored    Heart:   Regular rate   Chest Wall:    No abnormalities observed   Abdomen:     No masses, no organomegaly, soft        non-tender,  non-distended, no guarding, no rebound                Tenderness     Genitorurinary:    External genitalia without lesions  Uretheral meatus normal size, location without prolapse  No tenderness along urethera  Complete apical prolapse with the cervix visible outside of the introitus  Examination done with Sandie Hyde in the room   Extremities:   Moves all extremities well, no edema, no cyanosis, no             redness   Pulses:   Pulses palpable and equal bilaterally   Skin:   No bleeding, bruising or rash   Lymph nodes:   No palpable adenopathy   Neurologic:   Cranial nerves 2 - 12 grossly intact       Results Review:   I reviewed the patient's new clinical results.              Imaging Results (Last 24 Hours)     ** No results found for the last 24 hours. **        CT independent review  The CT scan of the abdomen/pelvis done without contrast is available for me to review.  Treatment recommendations require an independent review.  First I scanned the liver, spleen, and bowel pattern.  The retroperitoneum including the major vessels and lymphatic packages are briefly reviewed.  This film as been reviewed by the radiologist to determine any non urologic abnormalities that are present.  The kidneys are closely inspected for size, symmetry, contour, parenchymal thickness, perinephric reaction, presence of calcifications, and intrarenal dilation of the collecting system.  The ureters are inspected for their course, caliber, and any calcifications.  The bladder is inspected for its thickness, size, and presence of any calcifications.  This scan shows:    The right kidney appears abnormal on this non contrasted CT scan.   Multiple renal cysts measuring     The left kidney appears multiple renal cysts with chronic hydronephrosis down to the level of the bladder.  There is cortical thinning of the left kidney consistent with long-term obstruction.  There is a 1 mm distal ureteral stone    The bladder appears  significant pelvic prolapse.      Assessment/Plan       UTI (urinary tract infection)    Ureteral stone with hydronephrosis    Complete apical prolapse  Left-sided hydronephrosis chronic  Urinary tract infection    I reviewed outside records indicating creatinine of 1 urinalysis with 4+ bacteria.  Also discussed the case with the transferring physician Dr. Wang.  Patient appears nontoxic.  I personally reviewed her outside images which show a chronically dilated left kidney.  This is evident because there is cortical thinning of the kidney.  She has a punctate ureteral stone which is not the source of her obstruction.  The source of her obstruction is her significant pelvic prolapse which was causing kinking of the ureter.  I have recommended gynecological evaluation for pessary placement.  I discussed placing a ureteral stent with the patient she does not want to undergo this today.  She is very hesitant to undergo any sort of anesthetic procedure.  She will pass her ureteral stone without difficulty.  She will let us know if she wants to undergo ureteral stenting.  Conservative management would be pessary to reduce her prolapse and see if her hydronephrosis resolves with outpatient imaging.  Discussed this with Sandie Hyde.    I discussed the patients findings and my recommendations with patient and consulting provider    Chas Head MD  09/16/20  08:12 CDT            Electronically signed by Chas Head MD at 09/16/20 08         Nurse note:  Pt came from [facility]. She was swabbed for covid and came back negative, paper work in chart. She currently complains of no pain or nausea. VSS. Urology consult this AM. Straining urine. Incontinent at times. NPO. Will cont to monitor.    ivfl 75 hr    Iv abx     99.5 temp       Await consult for pessary placement ?

## 2020-09-16 NOTE — PLAN OF CARE
Problem: Adult Inpatient Plan of Care  Goal: Plan of Care Review  Flowsheets (Taken 9/16/2020 0400)  Plan of Care Reviewed With: patient  Outcome Summary: Pt came from [facility]. She was swabbed for covid and came back negative, paper work in chart. She currently complains of no pain or nausea. VSS. Urology consult this AM. Straining urine. Incontinent at times. NPO. Will cont to monitor.

## 2020-09-16 NOTE — H&P
[facility] Medicine Services  HISTORY AND PHYSICAL    Date of Admission: 9/16/2020  Primary Care Physician: Josefa Hassan MD    Subjective     Chief Complaint: Flank pain    History of Present Illness  88-year-old female who presents from [facility].  Dr. Heda was notified and the case was discussed with him.  The patient presented at that facility with left flank pain.  It radiated into her abdomen.  She was found to have +4 bacteria in her urine with a right hydronephrosis.  And a less than 1 mm ureteral stone.  The patient's white count was 10.4, platelets were 262.  BUN was 16 creatinine was 1.0 glucose was 119.  The patient on my exam was pain-free, and had no nausea or vomiting.  She was resting comfortably, and easily awakened.        Review of Systems   Flank pain    Otherwise complete ROS reviewed and negative except as mentioned in the HPI.    Past Medical History:   Odynophagia 07/01/2017   Primary osteoarthritis of right hip 04/21/2017   Intractable back pain 09/10/2016   Chronic pain syndrome 09/10/2016   Chronic anxiety 09/10/2016   Constipation 09/10/2016   Compression fracture     Osteoporosis     Osteoarthritis          Past Surgical History:  KYPHOSIS SURGERY 9/12/2016 N/A Kyphoplasty T12 performed by Timothy Jones DO at [facility] OR    Medical devices from this surgery are in the Implants section.     BACK SURGERY     FRACTURE REPAIR OF T12     KYPHOSIS SURGERY 11/11/2016 N/A KYPHOPLASTY of T11 performed by Timothy Jones DO at [facility] OR    Medical devices from this surgery are in the Implants section.     HC INJECT OTHER PERPHRL NERV 4/21/2017 Hip/Right INJECTION MEDICATION WITH FLUOROSCOPIC GUIDANCE performed by Que Cristobal MD at [facility] OR     TOTAL HIP ARTHROPLASTY 6/28/2017 Right HIP TOTAL ARTHROPLASTY ANT/SUPINE performed by Que Cristobal MD at [facility] OR    Medical devices from this surgery are in the Implants section.           Social History:   reports that she has never smoked. She has never used smokeless tobacco.    Family History:   Medical History Relation Name Comments   Sudden Death Father       Hypertension Mother         Relation Name Status Comments   Father        Mother                Allergies:  No Known Allergies  Medications:  Prior to Admission medications    Medication Sig Start Date End Date Taking? Authorizing Provider   calcium carbonate (OS-CLIFTON) 600 MG tablet Take 600 mg by mouth Daily.   Yes Radha Link MD   cholecalciferol (VITAMIN D3) 25 MCG (1000 UT) tablet Take 1,000 Units by mouth Daily.   Yes Radha Link MD   Omega-3 Fatty Acids (fish oil) 1000 MG capsule capsule Take 1,000 mg by mouth Daily With Breakfast.   Yes Radha Link MD   vitamin B-12 (CYANOCOBALAMIN) 500 MCG tablet Take 500 mcg by mouth Daily.   Yes Radha Link MD   vitamin E 200 UNIT capsule Take 200 Units by mouth Daily.   Yes Radha Link MD     Objective     Vital Signs: /66 (BP Location: Left arm, Patient Position: Lying)   Pulse 79   Temp 98 °F (36.7 °C) (Oral)   Resp 18   SpO2 98%   Physical Exam  Vitals signs reviewed.   Constitutional:       Appearance: Normal appearance.   HENT:      Head: Normocephalic and atraumatic.      Nose: Nose normal.   Eyes:      Conjunctiva/sclera: Conjunctivae normal.   Neck:      Musculoskeletal: Normal range of motion and neck supple.   Cardiovascular:      Rate and Rhythm: Normal rate and regular rhythm.      Heart sounds: Normal heart sounds.   Pulmonary:      Effort: Pulmonary effort is normal.      Breath sounds: Normal breath sounds.   Abdominal:      General: Bowel sounds are normal.      Palpations: Abdomen is soft.   Musculoskeletal:         General: No tenderness.   Skin:     General: Skin is warm and dry.   Neurological:      Mental Status: She is alert.      Cranial Nerves: No cranial nerve deficit.   Psychiatric:         Mood and Affect: Mood  normal.         Behavior: Behavior normal.           Results Reviewed:  Lab Results (last 24 hours)     ** No results found for the last 24 hours. **        Imaging Results (Last 24 Hours)     ** No results found for the last 24 hours. **        I have personally reviewed and interpreted the radiology studies and ECG obtained at time of admission.     Assessment / Plan     Assessment:   Active Hospital Problems    Diagnosis   • UTI (urinary tract infection)     Impression:  1.  Urinary tract infection  2.  Ureteral stone  3.  Flank pain  4.  Hydronephrosis    Plan:   1.  IV fluids  2.  Rocephin   3.  Urology consult  4.  Labs in the morning      Code Status: Full     I discussed the patient's findings and my recommendations with the patient    Estimated length of stay 1 to 2 days    Patient seen and examined by me on 9/16/2020    Electronically signed by Paloma Omer DO, 09/16/20, 03:59 CDT.

## 2020-09-16 NOTE — PROGRESS NOTES
[facility] Medicine Services  INPATIENT PROGRESS NOTE    Length of Stay: 0  Date of Admission: 9/16/2020  Primary Care Physician: Josefa Hassan MD    Subjective   Chief Complaint: Left abdominal pain  HPI   Patient reported sudden onset of left-sided abdominal pain flank region with some radiation to pelvic region.  She never had symptoms with this before.  She reported some nausea without vomiting.  She denied burning or dysuria.  No fever or chills.  Presented outside facility noted to have 4+ bacteria on urinalysis.  Less than 1 mm ureteral stone noted.  Hydronephrosis.  White blood cell count 10.4, creatinine 1.0.  Transferred for urology evaluation and treatment.    Patient is lying in bed.  She complains of dry mouth.  Reports left-sided abdominal pain with mild radiation to left groin that has improved.  Nausea has resolved.  She denies dysuria.  Was in the room with Dr. Head explained and offered patient treatment to include    Review of Systems   Constitutional: Negative for activity change, appetite change, chills, fatigue and fever.   HENT: Negative for congestion and trouble swallowing.    Eyes: Negative for photophobia and visual disturbance.   Respiratory: Negative for cough, shortness of breath and wheezing.    Cardiovascular: Negative for chest pain, palpitations and leg swelling.   Gastrointestinal: Positive for abdominal pain (Left-sided) and nausea. Negative for constipation, diarrhea and vomiting.   Endocrine: Negative for cold intolerance, heat intolerance and polyuria.   Genitourinary: Negative for dysuria.   Musculoskeletal: Negative for back pain.   Skin: Negative for color change, pallor, rash and wound.   Allergic/Immunologic: Negative for immunocompromised state.   Neurological: Positive for weakness.   Hematological: Negative for adenopathy. Does not bruise/bleed easily.   Psychiatric/Behavioral: Negative for agitation, behavioral problems and  confusion.      All pertinent negatives and positives are as above. All other systems have been reviewed and are negative unless otherwise stated.     Objective    Temp:  [98 °F (36.7 °C)-99.5 °F (37.5 °C)] 99.5 °F (37.5 °C)  Heart Rate:  [68-81] 68  Resp:  [18] 18  BP: (111-123)/(48-66) 111/57  Physical Exam  Vitals signs reviewed.   Constitutional:       Appearance: Normal appearance.      Comments: No distress.  Sitting up in bed.  No oxygen in use.   HENT:      Head: Normocephalic and atraumatic.      Nose: No congestion.      Mouth/Throat:      Mouth: Mucous membranes are dry.   Eyes:      Extraocular Movements: Extraocular movements intact.      Pupils: Pupils are equal, round, and reactive to light.   Neck:      Musculoskeletal: Normal range of motion and neck supple.   Cardiovascular:      Rate and Rhythm: Normal rate and regular rhythm.      Pulses: Normal pulses.      Heart sounds: No murmur. No friction rub. No gallop.    Pulmonary:      Effort: Pulmonary effort is normal.      Breath sounds: Normal breath sounds. No wheezing, rhonchi or rales.      Comments: No oxygen in use.  Chest:      Chest wall: No tenderness.   Abdominal:      Tenderness: There is abdominal tenderness (Mild left).   Genitourinary:     Comments: Uterine prolapse   Musculoskeletal:         General: No swelling or tenderness.      Right lower leg: No edema.      Left lower leg: No edema.      Comments: SCDs bilateral lower extremities   Skin:     General: Skin is warm and dry.   Neurological:      Mental Status: She is alert and oriented to person, place, and time.   Psychiatric:         Mood and Affect: Mood normal.         Behavior: Behavior normal.         Thought Content: Thought content normal.         Judgment: Judgment normal.       Results Review:  I have reviewed the labs, radiology results, and diagnostic studies.    Laboratory Data:      Results from last 7 days   Lab Units 09/16/20  0421   WBC 10*3/mm3 8.72   HEMOGLOBIN  g/dL 10.5*   HEMATOCRIT % 33.2*   PLATELETS 10*3/mm3 255        Results from last 7 days   Lab Units 09/16/20  0421   SODIUM mmol/L 139   POTASSIUM mmol/L 4.2   CHLORIDE mmol/L 104   CO2 mmol/L 25.0   BUN mg/dL 13   CREATININE mg/dL 0.97   GLUCOSE mg/dL 125*   CALCIUM mg/dL 9.2   ALT (SGPT) U/L 10     Culture Data:    No results found for: BLOODCX, URINECX, WOUNDCX, MRSACX, RESPCX, STOOLCX    Radiology Data:   Imaging Results (Last 7 Days)     ** No results found for the last 168 hours. **          Intake/Output    Intake/Output Summary (Last 24 hours) at 9/16/2020 0854  Last data filed at 9/16/2020 0421  Gross per 24 hour   Intake --   Output 300 ml   Net -300 ml       Scheduled Meds  cefTRIAXone, 2 g, Intravenous, Q24H  sodium chloride, 10 mL, Intravenous, Q12H      I have reviewed the patient current medications.     Assessment/Plan     Active Hospital Problems    Diagnosis   • Bacterial UTI   • Ureteral stone with hydronephrosis   • Uterine prolapse     Plan:  1.  Transferred from [facility] 9/16 with complaints of sudden onset left sided abdominal pain flank region with radiation to groin.  Complained of stabbing pain.  Reported nausea without vomiting.  No fever or chills.  Denies dysuria.  Urinalysis 4+ bacteria.  CT scan hydronephrosis.  Transferred for urology evaluation.  Rocephin given prior to transfer.  2.  Discussed with Dr. Head today and was in room with him when he discussed findings with patient.  Reviewed imaging studies that show chronically dilated left kidney evidence because there is cortical thinning of the kidney.  Punctate ureteral stone not source of obstruction.  Source of his obstruction is significant pelvic prolapse causing kinking of ureter.  Recommend gynecological evaluation for pessary placement.  Discussed ureteral stenting patient wants to decide today.  Conservative management would include pessary to reduce prolapse and see if hydronephrosis resolves with outpatient  imaging.  3.  Reviewed lab outside facility.  WBC 10.4, creatinine 1.0  4.  WBC 8.72, creatinine 0.97.  Continue IV fluids at 75 mL/h.  CBC, basic metabolic panel tomorrow.  Repeat urinalysis with in and out cath.  5.  Continue Rocephin until final culture available.  6.  Consult gynecology for possible pessary placement per recommendations of Dr. Head.  7.  N.p.o. after midnight for possible stent placement tomorrow.  8.  Home medications reviewed and resumed if appropriate.  9.  SCDs for deep vein thrombosis prophylaxis  10.  Tylenol for pain.  Zofran for nausea    HER-2 sons Chas and Nilson will assist with decision-making if she is unable to make decisions for herself.  The above documentation resulted from a face-to-face encounter by me Sandie RUCKER, [facility].    Discharge Planning: I expect patient to be discharged to home in 1 day.    Electronically signed by ALKA Kline, 9/16/2020, 08:54 CDT.    I personally evaluated and examined the patient in conjunction with ALKA Hooper and agree with the assessment, treatment plan, and disposition of the patient as recorded by her. My history, exam, and further recommendations are:   Will there be pain if I get stent?  How long before can I go home.   Gyne came as per patient.  They said to me she is not needing pessary.   Vitals:    09/16/20 1402   BP: 113/58   Pulse: 74   Resp: 18   Temp: 98.5 °F (36.9 °C)   SpO2: 95%     No distress  Coherent  Normal respiratory effort  All questions answered to my best of ability.    cpt  Electronically signed by Terry Bush MD, 9/16/2020, 19:00 CDT.

## 2020-09-16 NOTE — PLAN OF CARE
Goal Outcome Evaluation:  Plan of Care Reviewed With: patient, son, family  Progress: improving  Outcome Summary: Pt has denied any complaints of pain or nausea. Talked with Dr. Head about possible stent placement in AM and pt is to decide if she wants it done. NPO at [place]. Dr. Loaiza was also consulted and saw pt. Receiving IV Rocephin for UTI.

## 2020-09-16 NOTE — CONSULTS
Consults         Referring Provider: Gualberto  Reason for Consultation: Left Hydronephrosis    Chief complaint Left abdominal Pain    Subjective .     History of present illness:  Urolithiasis  Patient complains of left abdominal pain without radiation to the groin. Onset of symptoms was abrupt starting 1 day ago with completely resolved course since that time. Patient describes the pain as stabbing, continuous and rated as severe. The patient has had no vomiting. There has been no fever or chills. The patient is not complaining of dysuria, frequency, or urgency.  Previous management of stones includes none      Review of Systems  The following systems were reviewed and negative;  eyes, ENT, respiratory, cardiovascular, integument, breast, hematologic / lymphatic, musculoskeletal, neurological, behavioral/psych, endocrine and allergies / immunologic     History  No past medical history on file.    No past surgical history on file.    No family history on file.    Social History     Socioeconomic History   • Marital status:      Spouse name: Not on file   • Number of children: Not on file   • Years of education: Not on file   • Highest education level: Not on file   Tobacco Use   • Smoking status: Never Smoker   • Smokeless tobacco: Never Used       Current Facility-Administered Medications   Medication Dose Route Frequency Provider Last Rate Last Dose   • acetaminophen (TYLENOL) tablet 650 mg  650 mg Oral Q4H PRN Paloma Omer, DO       • cefTRIAXone (ROCEPHIN) 2 g/100 mL 0.9% NS IVPB (MBP)  2 g Intravenous Q24H Paloma Omer, DO       • ondansetron (ZOFRAN) injection 4 mg  4 mg Intravenous Q6H PRN Paloma Omer, DO       • sodium chloride 0.9 % flush 10 mL  10 mL Intravenous Q12H Paloma Omer DO       • sodium chloride 0.9 % flush 10 mL  10 mL Intravenous PRN Paloma Omer, DO       • sodium chloride 0.9 % infusion  75 mL/hr Intravenous Continuous Paloma Omer DO 75 mL/hr at  09/16/20 0426 75 mL/hr at 09/16/20 0426        No Known Allergies    Objective     Vital Signs   Temp:  [98 °F (36.7 °C)-99.5 °F (37.5 °C)] 99.5 °F (37.5 °C)  Heart Rate:  [68-81] 68  Resp:  [18] 18  BP: (111-123)/(48-66) 111/57    Intake/Output Summary (Last 24 hours) at 9/16/2020 0812  Last data filed at 9/16/2020 0421  Gross per 24 hour   Intake --   Output 300 ml   Net -300 ml       Physical Exam:     General Appearance:    Alert, cooperative, in no acute distress   Head:    Normocephalic, without obvious abnormality, atraumatic   Eyes:            Lids and lashes normal, conjunctivae and sclerae normal, no   icterus, no pallor, corneas clear, PERRLA   Ears:    Ears appear intact with no abnormalities noted   Throat:   No oral lesions, no thrush, oral mucosa moist   Neck:   No adenopathy, supple, trachea midline, no thyromegaly, no   carotid bruit, no JVD   Back:     No kyphosis present, no scoliosis present, no skin lesions,      erythema or scars, no tenderness to percussion or                   palpation,   range of motion normal   Lungs:    Respirations unlabored    Heart:   Regular rate   Chest Wall:    No abnormalities observed   Abdomen:     No masses, no organomegaly, soft        non-tender, non-distended, no guarding, no rebound                Tenderness     Genitorurinary:    External genitalia without lesions  Uretheral meatus normal size, location without prolapse  No tenderness along urethera  Complete apical prolapse with the cervix visible outside of the introitus  Examination done with Sandie Hyde in the room   Extremities:   Moves all extremities well, no edema, no cyanosis, no             redness   Pulses:   Pulses palpable and equal bilaterally   Skin:   No bleeding, bruising or rash   Lymph nodes:   No palpable adenopathy   Neurologic:   Cranial nerves 2 - 12 grossly intact       Results Review:   I reviewed the patient's new clinical results.              Imaging Results (Last 24 Hours)      ** No results found for the last 24 hours. **        CT independent review  The CT scan of the abdomen/pelvis done without contrast is available for me to review.  Treatment recommendations require an independent review.  First I scanned the liver, spleen, and bowel pattern.  The retroperitoneum including the major vessels and lymphatic packages are briefly reviewed.  This film as been reviewed by the radiologist to determine any non urologic abnormalities that are present.  The kidneys are closely inspected for size, symmetry, contour, parenchymal thickness, perinephric reaction, presence of calcifications, and intrarenal dilation of the collecting system.  The ureters are inspected for their course, caliber, and any calcifications.  The bladder is inspected for its thickness, size, and presence of any calcifications.  This scan shows:    The right kidney appears abnormal on this non contrasted CT scan.   Multiple renal cysts measuring     The left kidney appears multiple renal cysts with chronic hydronephrosis down to the level of the bladder.  There is cortical thinning of the left kidney consistent with long-term obstruction.  There is a 1 mm distal ureteral stone    The bladder appears significant pelvic prolapse.      Assessment/Plan       UTI (urinary tract infection)    Ureteral stone with hydronephrosis    Complete apical prolapse  Left-sided hydronephrosis chronic  Urinary tract infection    I reviewed outside records indicating creatinine of 1 urinalysis with 4+ bacteria.  Also discussed the case with the transferring physician Dr. Wang.  Patient appears nontoxic.  I personally reviewed her outside images which show a chronically dilated left kidney.  This is evident because there is cortical thinning of the kidney.  She has a punctate ureteral stone which is not the source of her obstruction.  The source of her obstruction is her significant pelvic prolapse which was causing kinking of the ureter.   I have recommended gynecological evaluation for pessary placement.  I discussed placing a ureteral stent with the patient she does not want to undergo this today.  She is very hesitant to undergo any sort of anesthetic procedure.  She will pass her ureteral stone without difficulty.  She will let us know if she wants to undergo ureteral stenting.  Conservative management would be pessary to reduce her prolapse and see if her hydronephrosis resolves with outpatient imaging.  Discussed this with Sandie Hyde.    I discussed the patients findings and my recommendations with patient and consulting provider    Chas Head MD  09/16/20  08:12 CDT

## 2020-09-17 VITALS
BODY MASS INDEX: 25.83 KG/M2 | DIASTOLIC BLOOD PRESSURE: 91 MMHG | HEART RATE: 62 BPM | WEIGHT: 145.8 LBS | OXYGEN SATURATION: 94 % | RESPIRATION RATE: 16 BRPM | TEMPERATURE: 98.5 F | SYSTOLIC BLOOD PRESSURE: 110 MMHG | HEIGHT: 63 IN

## 2020-09-17 LAB
BACTERIA SPEC AEROBE CULT: NORMAL
BASOPHILS # BLD AUTO: 0.07 10*3/MM3 (ref 0–0.2)
BASOPHILS NFR BLD AUTO: 1.2 % (ref 0–1.5)
DEPRECATED RDW RBC AUTO: 49.7 FL (ref 37–54)
EOSINOPHIL # BLD AUTO: 0.2 10*3/MM3 (ref 0–0.4)
EOSINOPHIL NFR BLD AUTO: 3.5 % (ref 0.3–6.2)
ERYTHROCYTE [DISTWIDTH] IN BLOOD BY AUTOMATED COUNT: 16.6 % (ref 12.3–15.4)
HCT VFR BLD AUTO: 31.1 % (ref 34–46.6)
HGB BLD-MCNC: 9.8 G/DL (ref 12–15.9)
IMM GRANULOCYTES # BLD AUTO: 0.02 10*3/MM3 (ref 0–0.05)
IMM GRANULOCYTES NFR BLD AUTO: 0.4 % (ref 0–0.5)
LYMPHOCYTES # BLD AUTO: 2.05 10*3/MM3 (ref 0.7–3.1)
LYMPHOCYTES NFR BLD AUTO: 35.9 % (ref 19.6–45.3)
MCH RBC QN AUTO: 26.1 PG (ref 26.6–33)
MCHC RBC AUTO-ENTMCNC: 31.5 G/DL (ref 31.5–35.7)
MCV RBC AUTO: 82.9 FL (ref 79–97)
MONOCYTES # BLD AUTO: 0.48 10*3/MM3 (ref 0.1–0.9)
MONOCYTES NFR BLD AUTO: 8.4 % (ref 5–12)
NEUTROPHILS NFR BLD AUTO: 2.89 10*3/MM3 (ref 1.7–7)
NEUTROPHILS NFR BLD AUTO: 50.6 % (ref 42.7–76)
NRBC BLD AUTO-RTO: 0 /100 WBC (ref 0–0.2)
PLATELET # BLD AUTO: 237 10*3/MM3 (ref 140–450)
PMV BLD AUTO: 10.8 FL (ref 6–12)
RBC # BLD AUTO: 3.75 10*6/MM3 (ref 3.77–5.28)
WBC # BLD AUTO: 5.71 10*3/MM3 (ref 3.4–10.8)

## 2020-09-17 PROCEDURE — G0378 HOSPITAL OBSERVATION PER HR: HCPCS

## 2020-09-17 PROCEDURE — 99232 SBSQ HOSP IP/OBS MODERATE 35: CPT | Performed by: UROLOGY

## 2020-09-17 PROCEDURE — 85025 COMPLETE CBC W/AUTO DIFF WBC: CPT | Performed by: NURSE PRACTITIONER

## 2020-09-17 RX ORDER — CEFDINIR 300 MG/1
300 CAPSULE ORAL 2 TIMES DAILY
Qty: 12 CAPSULE | Refills: 0 | Status: SHIPPED | OUTPATIENT
Start: 2020-09-17 | End: 2020-09-23

## 2020-09-17 NOTE — PAYOR COMM NOTE
"Adriana Peters (88 y.o. Female)   Attn   Vianey  [id number]   [facility] phone   Fax       in [phone]       obs order and today progress note     Date of Birth Social Security Number Address Home Phone MRN    1932  [address] [address] [phone] [id number]    Mormonism Marital Status          Other        Admission Date Admission Type Admitting Provider Attending Provider Department, Room/Bed    20 Urgent Terry Bush MD [place], Glenn Riego, MD [facility] 3C, 360/1    Discharge Date Discharge Disposition Discharge Destination                       Attending Provider: Terry Bush MD    Allergies: No Known Allergies    Isolation: None   Infection: None   Code Status: CPR    Ht: 160 cm (63\")   Wt: 66.1 kg (145 lb 12.8 oz)    Admission Cmt: None   Principal Problem: None                Active Insurance as of 2020     Primary Coverage     Payor Plan Insurance Group Employer/Plan Group    HUMANA MEDICARE REPLACEMENT HUMANA MEDICARE REPLACEMENT [id number]     Payor Plan Address Payor Plan Phone Number Payor Plan Fax Number Effective Dates    [address] [address] [phone]  2020 - None Entered    [facility] [address]       Subscriber Name Subscriber Birth Date Member ID       ADRIANA PETERS 1932 [id number]                 Emergency Contacts      (Rel.) Home Phone Work Phone Mobile Phone    JIM PETERS (Son) [phone] -- [phone]    TOÑO PETERS (Son) -- -- [phone]        [address] [address]  Phone:  [phone]  Fax:          Patient:     Adriana Peters MRN:  [id number]   [address] [facility] [address] :  1932  SSN:    Phone: [phone] Sex:  F      INSURANCE PAYOR PLAN GROUP # SUBSCRIBER ID   Primary:    HUMANA MEDICARE REPLACEMENT [id number] "   Admitting Diagnosis: UTI (urinary tract infection) [N39.0]  Order Date:  Sep 17, 2020         Initiate Observation Status       (Order ID: [id number])     Diagnosis:         Priority:  Routine Expected Date:   Expiration Date:        Interval:   Count:    Level of Care: Med/Surg [1]  Diagnosis: Ureteral stone with hydronephrosis [003452]  Admitting Physician: LILI SHANNON [[id number]]  Attending Physician: LILI SHANNON [[id number]]     Specimen Type:   Specimen Source:   Specimen Taken Date:   Specimen Taken Time:                   Verbal Order Mode: Verbal with readback   Authorizing Provider: Lili Shannon MD  Authorizing Provider's NPI: [id number]     Order Entered By: Luz Clemens RN 9/17/2020  7:09 AM     Electronically signed by:                 Physician Progress Notes (last 24 hours) (Notes from 09/16/20 0730 through 09/17/20 0730)      Chas Head MD at 09/17/20 0630          Subjective    Ms. Peters is 88 y.o. female    Chief Complaint: Left hydronephrosis    History of Present Illness  Denies flank pain denies fevers passed a small ureteral stone has hematuria  The following portions of the patient's history were reviewed and updated as appropriate: allergies, current medications, past family history, past medical history, past social history, past surgical history and problem list.    Review of Systems   Constitutional: Negative for chills and fever.   Gastrointestinal: Negative for abdominal pain, anal bleeding and blood in stool.   Genitourinary: Negative for dysuria and hematuria.         Current Facility-Administered Medications:   •  acetaminophen (TYLENOL) tablet 650 mg, 650 mg, Oral, Q4H PRN, Paloma Omer DO  •  cefTRIAXone (ROCEPHIN) 2 g/100 mL 0.9% NS IVPB (MBP), 2 g, Intravenous, Q24H, Paloma Omer DO, 2 g at 09/16/20 5598  •  ondansetron (ZOFRAN) injection 4 mg, 4 mg, Intravenous, Q6H PRN, Paloma Omer DO  •  sodium chloride 0.9 %  "flush 10 mL, 10 mL, Intravenous, PRN, Paloma Omer DO  •  sodium chloride 0.9 % infusion, 75 mL/hr, Intravenous, Continuous, Paloma Omer DO, Last Rate: 75 mL/hr at 09/16/20 1846, 75 mL/hr at 09/16/20 1846    History reviewed. No pertinent past medical history.    No past surgical history on file.    Social History     Socioeconomic History   • Marital status:      Spouse name: Not on file   • Number of children: Not on file   • Years of education: Not on file   • Highest education level: Not on file   Tobacco Use   • Smoking status: Never Smoker   • Smokeless tobacco: Never Used       History reviewed. No pertinent family history.    Objective    /46 (BP Location: Left arm, Patient Position: Lying)   Pulse 63   Temp 98.5 °F (36.9 °C) (Oral)   Resp 16   Ht 160 cm (63\")   Wt 66.1 kg (145 lb 12.8 oz)   SpO2 95%   BMI 25.83 kg/m²     Intake/Output Summary (Last 24 hours) at 9/17/2020 0630  Last data filed at 9/17/2020 0026  Gross per 24 hour   Intake 2807 ml   Output 1350 ml   Net 1457 ml       Physical Exam  Vitals signs reviewed.   Constitutional:       General: She is not in acute distress.     Appearance: She is well-developed. She is not diaphoretic.   Pulmonary:      Effort: Pulmonary effort is normal.   Abdominal:      General: There is no distension.      Palpations: Abdomen is soft. There is no mass.      Tenderness: There is no abdominal tenderness. There is no guarding or rebound.      Hernia: No hernia is present.   Skin:     General: Skin is warm and dry.   Neurological:      Mental Status: She is alert and oriented to person, place, and time.         Results Review:   I reviewed the patient's new clinical results.        Imaging Results (Last 24 Hours)     ** No results found for the last 24 hours. **            Results for orders placed or performed during the hospital encounter of 09/16/20   CBC Auto Differential    Specimen: Blood   Result Value Ref Range    WBC 8.72 " 3.40 - 10.80 10*3/mm3    RBC 4.02 3.77 - 5.28 10*6/mm3    Hemoglobin 10.5 (L) 12.0 - 15.9 g/dL    Hematocrit 33.2 (L) 34.0 - 46.6 %    MCV 82.6 79.0 - 97.0 fL    MCH 26.1 (L) 26.6 - 33.0 pg    MCHC 31.6 31.5 - 35.7 g/dL    RDW 16.2 (H) 12.3 - 15.4 %    RDW-SD 49.3 37.0 - 54.0 fl    MPV 10.7 6.0 - 12.0 fL    Platelets 255 140 - 450 10*3/mm3    Neutrophil % 78.0 (H) 42.7 - 76.0 %    Lymphocyte % 14.2 (L) 19.6 - 45.3 %    Monocyte % 6.7 5.0 - 12.0 %    Eosinophil % 0.1 (L) 0.3 - 6.2 %    Basophil % 0.5 0.0 - 1.5 %    Immature Grans % 0.5 0.0 - 0.5 %    Neutrophils, Absolute 6.81 1.70 - 7.00 10*3/mm3    Lymphocytes, Absolute 1.24 0.70 - 3.10 10*3/mm3    Monocytes, Absolute 0.58 0.10 - 0.90 10*3/mm3    Eosinophils, Absolute 0.01 0.00 - 0.40 10*3/mm3    Basophils, Absolute 0.04 0.00 - 0.20 10*3/mm3    Immature Grans, Absolute 0.04 0.00 - 0.05 10*3/mm3    nRBC 0.0 0.0 - 0.2 /100 WBC   Comprehensive Metabolic Panel    Specimen: Blood   Result Value Ref Range    Glucose 125 (H) 65 - 99 mg/dL    BUN 13 8 - 23 mg/dL    Creatinine 0.97 0.57 - 1.00 mg/dL    Sodium 139 136 - 145 mmol/L    Potassium 4.2 3.5 - 5.2 mmol/L    Chloride 104 98 - 107 mmol/L    CO2 25.0 22.0 - 29.0 mmol/L    Calcium 9.2 8.6 - 10.5 mg/dL    Total Protein 6.8 6.0 - 8.5 g/dL    Albumin 3.90 3.50 - 5.20 g/dL    ALT (SGPT) 10 1 - 33 U/L    AST (SGOT) 33 (H) 1 - 32 U/L    Alkaline Phosphatase 71 39 - 117 U/L    Total Bilirubin 0.3 0.0 - 1.2 mg/dL    eGFR Non African Amer 54 (L) >60 mL/min/1.73    Globulin 2.9 gm/dL    A/G Ratio 1.3 g/dL    BUN/Creatinine Ratio 13.4 7.0 - 25.0    Anion Gap 10.0 5.0 - 15.0 mmol/L   Procalcitonin    Specimen: Blood   Result Value Ref Range    Procalcitonin 0.08 0.00 - 0.25 ng/mL   Magnesium    Specimen: Blood   Result Value Ref Range    Magnesium 2.0 1.6 - 2.4 mg/dL   Urinalysis With Culture If Indicated - Urine, Catheter In/Out    Specimen: Urine, Catheter In/Out   Result Value Ref Range    Color, UA Yellow Yellow, Straw     Appearance, UA Cloudy (A) Clear    pH, UA 7.0 5.0 - 8.0    Specific Gravity, UA 1.009 1.005 - 1.030    Glucose, UA Negative Negative    Ketones, UA Negative Negative    Bilirubin, UA Negative Negative    Blood, UA Moderate (2+) (A) Negative    Protein, UA 30 mg/dL (1+) (A) Negative    Leuk Esterase, UA Large (3+) (A) Negative    Nitrite, UA Negative Negative    Urobilinogen, UA 0.2 E.U./dL 0.2 - 1.0 E.U./dL   Urinalysis, Microscopic Only - Urine, Catheter In/Out    Specimen: Urine, Catheter In/Out   Result Value Ref Range    RBC, UA 3-5 (A) None Seen /HPF    WBC, UA Too Numerous to Count (A) None Seen /HPF    Bacteria, UA Trace (A) None Seen /HPF    Squamous Epithelial Cells, UA None Seen None Seen, 0-2 /HPF    Yeast, UA Small/1+ Yeast None Seen /HPF    Hyaline Casts, UA None Seen None Seen /LPF    Methodology Manual Light Microscopy    CBC Auto Differential    Specimen: Blood   Result Value Ref Range    WBC 5.71 3.40 - 10.80 10*3/mm3    RBC 3.75 (L) 3.77 - 5.28 10*6/mm3    Hemoglobin 9.8 (L) 12.0 - 15.9 g/dL    Hematocrit 31.1 (L) 34.0 - 46.6 %    MCV 82.9 79.0 - 97.0 fL    MCH 26.1 (L) 26.6 - 33.0 pg    MCHC 31.5 31.5 - 35.7 g/dL    RDW 16.6 (H) 12.3 - 15.4 %    RDW-SD 49.7 37.0 - 54.0 fl    MPV 10.8 6.0 - 12.0 fL    Platelets 237 140 - 450 10*3/mm3    Neutrophil % 50.6 42.7 - 76.0 %    Lymphocyte % 35.9 19.6 - 45.3 %    Monocyte % 8.4 5.0 - 12.0 %    Eosinophil % 3.5 0.3 - 6.2 %    Basophil % 1.2 0.0 - 1.5 %    Immature Grans % 0.4 0.0 - 0.5 %    Neutrophils, Absolute 2.89 1.70 - 7.00 10*3/mm3    Lymphocytes, Absolute 2.05 0.70 - 3.10 10*3/mm3    Monocytes, Absolute 0.48 0.10 - 0.90 10*3/mm3    Eosinophils, Absolute 0.20 0.00 - 0.40 10*3/mm3    Basophils, Absolute 0.07 0.00 - 0.20 10*3/mm3    Immature Grans, Absolute 0.02 0.00 - 0.05 10*3/mm3    nRBC 0.0 0.0 - 0.2 /100 WBC     Assessment and Plan      UTI (urinary tract infection)    Ureteral stone with hydronephrosis     Complete apical prolapse  Left-sided  "hydronephrosis chronic  Urinary tract infection    Reviewed gynecology's evaluation.  Certainly I have seen cases where prolapse has caused kinking of the ureter and obstruction of the kidney.  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  This is not an acute issue.  She has passed a small ureteral stone.    She is nontoxic she is free of flank pain and has no evidence of acute kidney injury.  I do not see the benefit in placing a ureteral stent in her as she is completely asymptomatic.  He has opted for observation.  Placing the ureteral stent will not correct the underlying issue.  I recommend culture specific antibiotics.  She can go home from urology standpoint.  She can follow-up with me in 6 weeks with renal ultrasound.          Electronically signed by Chas Head MD at 09/17/20 8312     Astrid Loaiza MD at 09/16/20 0706        Reviewed chart. Imaging reviewed with radiologist Dr. Juan Antonio Jose at [facility].  Some stenosis and kinking seen well outside the pelvis, all the way up near the kidney on the left side at the UPJ. While it's not completely obstructed, he does report there is significant stenosis at that level.    Long discussion with patient regarding GYN history.  She has had some prolapse for 15 years.  During that time, \"it hasn't changed a bit\".  No increasing pressure, no cervical excoriation seen on exam. On exam, she has a grade III UV prolapse, incomplete.    Now, significant UV prolapse (complete with cervix outside vaginal canal, past introitus), we can occasionally see urethral kinking due to it's hypermobility, but not ureteral. This generally results in urinary retention, which the patient denies. In less severe cases, like hers, we typically see urinary incontinence, which she does report.  However, as the uterus moves down (prolapse just a few centimeters in her case) it will tend to stretch, not kink the ureter.  Most common cause " "of stenosis and kinking at that level would either be congenital, mass, stone or some other Urologic or Renal disorder.    It's unlikely that an organ that hasn't moved in 15 years is causing any acute issues. A pessary is generally used to \"unkink\" the urethra in cases of urinary retention, and should she experience any of the above, we could certainly get her in urgently. For prolapse of this degree, the purpose of a pessary would generally be for quality of life, due to pressure or pelvic discomfort, again, not a current complaint.    We're happy to see her once she's discharged if she so chooses, but, I suspect she will not, as she very clearly stated its never caused her any problems, even discomfort in 15 years.    Astrid Loaiza MD  Obstetrics & Gynecology  9/16/2020  12:42 CDT    Electronically signed by Astrid Loaiza MD at 09/16/20 1328     SherryGreeley County HospitalTerry MD at 09/16/20 0831              [facility] Medicine Services  INPATIENT PROGRESS NOTE    Length of Stay: 0  Date of Admission: 9/16/2020  Primary Care Physician: Josefa Hassan MD    Subjective   Chief Complaint: Left abdominal pain  HPI   Patient reported sudden onset of left-sided abdominal pain flank region with some radiation to pelvic region.  She never had symptoms with this before.  She reported some nausea without vomiting.  She denied burning or dysuria.  No fever or chills.  Presented outside facility noted to have 4+ bacteria on urinalysis.  Less than 1 mm ureteral stone noted.  Hydronephrosis.  White blood cell count 10.4, creatinine 1.0.  Transferred for urology evaluation and treatment.    Patient is lying in bed.  She complains of dry mouth.  Reports left-sided abdominal pain with mild radiation to left groin that has improved.  Nausea has resolved.  She denies dysuria.  Was in the room with Dr. Head explained and offered patient treatment to include    Review of Systems   "   Constitutional: Negative for activity change, appetite change, chills, fatigue and fever.   HENT: Negative for congestion and trouble swallowing.    Eyes: Negative for photophobia and visual disturbance.   Respiratory: Negative for cough, shortness of breath and wheezing.    Cardiovascular: Negative for chest pain, palpitations and leg swelling.   Gastrointestinal: Positive for abdominal pain (Left-sided) and nausea. Negative for constipation, diarrhea and vomiting.   Endocrine: Negative for cold intolerance, heat intolerance and polyuria.   Genitourinary: Negative for dysuria.   Musculoskeletal: Negative for back pain.   Skin: Negative for color change, pallor, rash and wound.   Allergic/Immunologic: Negative for immunocompromised state.   Neurological: Positive for weakness.   Hematological: Negative for adenopathy. Does not bruise/bleed easily.   Psychiatric/Behavioral: Negative for agitation, behavioral problems and confusion.      All pertinent negatives and positives are as above. All other systems have been reviewed and are negative unless otherwise stated.     Objective    Temp:  [98 °F (36.7 °C)-99.5 °F (37.5 °C)] 99.5 °F (37.5 °C)  Heart Rate:  [68-81] 68  Resp:  [18] 18  BP: (111-123)/(48-66) 111/57  Physical Exam  Vitals signs reviewed.   Constitutional:       Appearance: Normal appearance.      Comments: No distress.  Sitting up in bed.  No oxygen in use.   HENT:      Head: Normocephalic and atraumatic.      Nose: No congestion.      Mouth/Throat:      Mouth: Mucous membranes are dry.   Eyes:      Extraocular Movements: Extraocular movements intact.      Pupils: Pupils are equal, round, and reactive to light.   Neck:      Musculoskeletal: Normal range of motion and neck supple.   Cardiovascular:      Rate and Rhythm: Normal rate and regular rhythm.      Pulses: Normal pulses.      Heart sounds: No murmur. No friction rub. No gallop.    Pulmonary:      Effort: Pulmonary effort is normal.      Breath  sounds: Normal breath sounds. No wheezing, rhonchi or rales.      Comments: No oxygen in use.  Chest:      Chest wall: No tenderness.   Abdominal:      Tenderness: There is abdominal tenderness (Mild left).   Genitourinary:     Comments: Uterine prolapse   Musculoskeletal:         General: No swelling or tenderness.      Right lower leg: No edema.      Left lower leg: No edema.      Comments: SCDs bilateral lower extremities   Skin:     General: Skin is warm and dry.   Neurological:      Mental Status: She is alert and oriented to person, place, and time.   Psychiatric:         Mood and Affect: Mood normal.         Behavior: Behavior normal.         Thought Content: Thought content normal.         Judgment: Judgment normal.       Results Review:  I have reviewed the labs, radiology results, and diagnostic studies.    Laboratory Data:      Results from last 7 days   Lab Units 09/16/20  0421   WBC 10*3/mm3 8.72   HEMOGLOBIN g/dL 10.5*   HEMATOCRIT % 33.2*   PLATELETS 10*3/mm3 255        Results from last 7 days   Lab Units 09/16/20  0421   SODIUM mmol/L 139   POTASSIUM mmol/L 4.2   CHLORIDE mmol/L 104   CO2 mmol/L 25.0   BUN mg/dL 13   CREATININE mg/dL 0.97   GLUCOSE mg/dL 125*   CALCIUM mg/dL 9.2   ALT (SGPT) U/L 10     Culture Data:    No results found for: BLOODCX, URINECX, WOUNDCX, MRSACX, RESPCX, STOOLCX    Radiology Data:   Imaging Results (Last 7 Days)     ** No results found for the last 168 hours. **          Intake/Output    Intake/Output Summary (Last 24 hours) at 9/16/2020 0854  Last data filed at 9/16/2020 0421  Gross per 24 hour   Intake --   Output 300 ml   Net -300 ml       Scheduled Meds  cefTRIAXone, 2 g, Intravenous, Q24H  sodium chloride, 10 mL, Intravenous, Q12H      I have reviewed the patient current medications.     Assessment/Plan     Active Hospital Problems    Diagnosis   • Bacterial UTI   • Ureteral stone with hydronephrosis   • Uterine prolapse     Plan:  1.  [facility]16 with complaints of sudden onset left sided abdominal pain flank region with radiation to groin.  Complained of stabbing pain.  Reported nausea without vomiting.  No fever or chills.  Denies dysuria.  Urinalysis 4+ bacteria.  CT scan hydronephrosis.  Transferred for urology evaluation.  Rocephin given prior to transfer.  2.  Discussed with Dr. Head today and was in room with him when he discussed findings with patient.  Reviewed imaging studies that show chronically dilated left kidney evidence because there is cortical thinning of the kidney.  Punctate ureteral stone not source of obstruction.  Source of his obstruction is significant pelvic prolapse causing kinking of ureter.  Recommend gynecological evaluation for pessary placement.  Discussed ureteral stenting patient wants to decide today.  Conservative management would include pessary to reduce prolapse and see if hydronephrosis resolves with outpatient imaging.  3.  Reviewed lab outside facility.  WBC 10.4, creatinine 1.0  4.  WBC 8.72, creatinine 0.97.  Continue IV fluids at 75 mL/h.  CBC, basic metabolic panel tomorrow.  Repeat urinalysis with in and out cath.  5.  Continue Rocephin until final culture available.  6.  Consult gynecology for possible pessary placement per recommendations of Dr. Head.  7.  N.p.o. after midnight for possible stent placement tomorrow.  8.  Home medications reviewed and resumed if appropriate.  9.  SCDs for deep vein thrombosis prophylaxis  10.  Tylenol for pain.  Zofran for nausea    HER-2 sons Chas and Nilson will assist with decision-making if she is unable to make decisions for herself.  The above documentation resulted from a face-to-face encounter by me Sandie RUCKER, [facility].    Discharge Planning: I expect patient to be discharged to home in 1 day.    Electronically signed by ALKA Kline, 9/16/2020, 08:54 CDT.    I personally evaluated and examined the patient in conjunction with Sandie  APRN and agree with the assessment, treatment plan, and disposition of the patient as recorded by her. My history, exam, and further recommendations are:   Will there be pain if I get stent?  How long before can I go home.   Gyne came as per patient.  They said to me she is not needing pessary.   Vitals:    09/16/20 1402   BP: 113/58   Pulse: 74   Resp: 18   Temp: 98.5 °F (36.9 °C)   SpO2: 95%     No distress  Coherent  Normal respiratory effort  All questions answered to my best of ability.    cpt  Electronically signed by Terry Bush MD, 9/16/2020, 19:00 CDT.        Electronically signed by Terry Bush MD at 09/16/20 1910       She is nontoxic she is free of flank pain and has no evidence of acute kidney injury.  I do not see the benefit in placing a ureteral stent in her as she is completely asymptomatic.  He has opted for observation.  Placing the ureteral stent will not correct the underlying issue.  I recommend culture specific antibiotics.  She can go home from urology standpoint.  She can follow-up with me in 6 weeks with renal ultrasound.

## 2020-09-17 NOTE — PLAN OF CARE
Goal Outcome Evaluation:  Plan of Care Reviewed With: patient, son, family  Progress: improving  Outcome Summary: Patient denied pain/nausea. Up with assitance to [facility]. Patient decided to have a stent placement; NPO diet just in case. No orders yet. IVF and IV abx. VSS. Safety maintained. Will continue to monitor.

## 2020-09-17 NOTE — DISCHARGE SUMMARY
[facility] Medicine Services  DISCHARGE SUMMARY       Date of Admission: 9/16/2020  Date of Discharge:  9/17/2020  Primary Care Physician: Josefa Hassan MD    Presenting Problem/History of Present Illness:  Flank pain    Final Discharge Diagnoses:  Active Hospital Problems    Diagnosis   • Bacterial UTI   • Ureteral stone with hydronephrosis   • Uterine prolapse     Consults:   1.  Urology  2.  Gynecology    Procedures Performed: None    Pertinent Test Results:   Lab Results (all)     Procedure Component Value Units Date/Time    CBC Auto Differential [[id number]]  (Abnormal) Collected: 09/17/20 0430    Specimen: Blood Updated: 09/17/20 0448     WBC 5.71 10*3/mm3      RBC 3.75 10*6/mm3      Hemoglobin 9.8 g/dL      Hematocrit 31.1 %      MCV 82.9 fL      MCH 26.1 pg      MCHC 31.5 g/dL      RDW 16.6 %      RDW-SD 49.7 fl      MPV 10.8 fL      Platelets 237 10*3/mm3      Neutrophil % 50.6 %      Lymphocyte % 35.9 %      Monocyte % 8.4 %      Eosinophil % 3.5 %      Basophil % 1.2 %      Immature Grans % 0.4 %      Neutrophils, Absolute 2.89 10*3/mm3      Lymphocytes, Absolute 2.05 10*3/mm3      Monocytes, Absolute 0.48 10*3/mm3      Eosinophils, Absolute 0.20 10*3/mm3      Basophils, Absolute 0.07 10*3/mm3      Immature Grans, Absolute 0.02 10*3/mm3      nRBC 0.0 /100 WBC     Urinalysis, Microscopic Only - Urine, Catheter In/Out [[id number]]  (Abnormal) Collected: 09/16/20 0924    Specimen: Urine, Catheter In/Out Updated: 09/16/20 0954     RBC, UA 3-5 /HPF      WBC, UA Too Numerous to Count /HPF      Bacteria, UA Trace /HPF      Squamous Epithelial Cells, UA None Seen /HPF      Yeast, UA Small/1+ Yeast /HPF      Hyaline Casts, UA None Seen /LPF      Methodology Manual Light Microscopy    Urine Culture - Urine, Urine, Catheter In/Out [[id number]] Collected: 09/16/20 0924    Specimen: Urine, Catheter In/Out Updated: 09/16/20 0954    Urinalysis With Culture If Indicated - Urine,  Catheter In/Out [[id number]]  (Abnormal) Collected: 09/16/20 0924    Specimen: Urine, Catheter In/Out Updated: 09/16/20 0938     Color, UA Yellow     Appearance, UA Cloudy     pH, UA 7.0     Specific Gravity, UA 1.009     Glucose, UA Negative     Ketones, UA Negative     Bilirubin, UA Negative     Blood, UA Moderate (2+)     Protein, UA 30 mg/dL (1+)     Leuk Esterase, UA Large (3+)     Nitrite, UA Negative     Urobilinogen, UA 0.2 E.U./dL    Procalcitonin [[id number]]  (Normal) Collected: 09/16/20 0421    Specimen: Blood Updated: 09/16/20 0523     Procalcitonin 0.08 ng/mL     Narrative:      Comprehensive Metabolic Panel [[id number]]  (Abnormal) Collected: 09/16/20 0421    Specimen: Blood Updated: 09/16/20 0517     Glucose 125 mg/dL      BUN 13 mg/dL      Creatinine 0.97 mg/dL      Sodium 139 mmol/L      Potassium 4.2 mmol/L      Chloride 104 mmol/L      CO2 25.0 mmol/L      Calcium 9.2 mg/dL      Total Protein 6.8 g/dL      Albumin 3.90 g/dL      ALT (SGPT) 10 U/L      AST (SGOT) 33 U/L      Alkaline Phosphatase 71 U/L      Total Bilirubin 0.3 mg/dL      eGFR Non African Amer 54 mL/min/1.73      Globulin 2.9 gm/dL      A/G Ratio 1.3 g/dL      BUN/Creatinine Ratio 13.4     Anion Gap 10.0 mmol/L     Magnesium [[id number]]  (Normal) Collected: 09/16/20 0421    Specimen: Blood Updated: 09/16/20 0517     Magnesium 2.0 mg/dL     CBC Auto Differential [[id number]]  (Abnormal) Collected: 09/16/20 0421    Specimen: Blood Updated: 09/16/20 0454     WBC 8.72 10*3/mm3      RBC 4.02 10*6/mm3      Hemoglobin 10.5 g/dL      Hematocrit 33.2 %      MCV 82.6 fL      MCH 26.1 pg      MCHC 31.6 g/dL      RDW 16.2 %      RDW-SD 49.3 fl      MPV 10.7 fL      Platelets 255 10*3/mm3      Neutrophil % 78.0 %      Lymphocyte % 14.2 %      Monocyte % 6.7 %      Eosinophil % 0.1 %      Basophil % 0.5 %      Immature Grans % 0.5 %      Neutrophils, Absolute 6.81 10*3/mm3      Lymphocytes, Absolute 1.24 10*3/mm3      Monocytes, Absolute 0.58  10*3/mm3      Eosinophils, Absolute 0.01 10*3/mm3      Basophils, Absolute 0.04 10*3/mm3      Immature Grans, Absolute 0.04 10*3/mm3      nRBC 0.0 /100 WBC         Imaging Results (All)     None        History of Present Illness on Day of Discharge: Patient resting in bed.  She denies any dysuria or flank pain today.    Hospital Course:  Ms. Peters is a pleasant 88-year-old  female who follows Dr. Josefa Hassan for primary care.  She has a medical history significant for osteoarthritis, chronic pain, and anxiety.  The patient presented to an [facility] facility on 9/15/2020 with complaints of left flank pain radiating to the groin.  CT scan reportedly showed hydronephrosis on the left.  Urinalysis showed 4+ bacteria.  The patient was transferred to our facility for urological evaluation.  The patient was admitted to the hospitalist service for further evaluation and management.    Dr. Head reviewed imaging studies which show chronic dilation of the left kidney, which is evident due to cortical thinning of the kidney.  She had a punctate ureteral stone which was not felt to be the source of obstruction.  Dr. Head felt as though with significant uterine prolapse was possibly causing kinking of the ureter.  Patient was evaluated by gynecology to see if placing a pessary to reduce her prolapse would resolve hydronephrosis.  Gynecology evaluated, and does not feel her prolapse is causing stenosis or kinking of the ureter.  Gynecology states occasional urethral kinking is seen in similar cases but not ureteral.  They do not feel placing a pessary at this time would be beneficial as she does not complain pressure or pelvic discomfort.  As the patient is now free of flank pain without evidence of acute kidney injury, Dr. Head did not feel there would be any benefit in placing a ureteral stent at this time.  He recommends culture specific antibiotics.  The patient's urine culture is still pending at this time.  Being  "that her symptoms have improved with Rocephin, we will continue course with Omnicef to total 7 days of antibiotic therapy.  We will follow-up with urine culture and change antibiotic therapy if necessary.    Overall, patient is hemodynamically stable and appropriate for discharge home today.  She will follow-up with urology in 6 weeks with a repeat renal ultrasound.    Condition on Discharge:  Medically stable    Physical Exam on Discharge:  /91 (BP Location: Left arm, Patient Position: Sitting)   Pulse 62   Temp 98.5 °F (36.9 °C) (Oral)   Resp 16   Ht 160 cm (63\")   Wt 66.1 kg (145 lb 12.8 oz)   SpO2 94%   BMI 25.83 kg/m²   Physical Exam  Constitutional:       General: She is not in acute distress.     Appearance: Normal appearance. She is not ill-appearing or toxic-appearing.   HENT:      Head: Normocephalic and atraumatic.   Neck:      Musculoskeletal: Normal range of motion and neck supple. No muscular tenderness.   Cardiovascular:      Rate and Rhythm: Normal rate and regular rhythm.      Heart sounds: Murmur present.   Pulmonary:      Effort: Pulmonary effort is normal.      Breath sounds: Normal breath sounds. No wheezing or rales.   Abdominal:      General: Bowel sounds are normal. There is no distension.      Palpations: Abdomen is soft.      Tenderness: There is no abdominal tenderness. There is no right CVA tenderness or left CVA tenderness.   Musculoskeletal: Normal range of motion.         General: No swelling or tenderness.   Skin:     General: Skin is warm and dry.      Findings: No erythema or rash.   Neurological:      General: No focal deficit present.      Mental Status: She is alert and oriented to person, place, and time.   Psychiatric:         Mood and Affect: Mood normal.         Behavior: Behavior normal.         Thought Content: Thought content normal.         Judgment: Judgment normal.       Discharge Disposition:  Home or Self Care    Discharge Medications:     Discharge " Medications      New Medications      Instructions Start Date   cefdinir 300 MG capsule  Commonly known as: OMNICEF   300 mg, Oral, 2 Times Daily         Continue These Medications      Instructions Start Date   calcium carbonate 600 MG tablet  Commonly known as: OS-CLIFTON   600 mg, Oral, Daily      cholecalciferol 25 MCG (1000 UT) tablet  Commonly known as: VITAMIN D3   1,000 Units, Oral, Daily      fish oil 1000 MG capsule capsule   1,000 mg, Oral, Daily With Breakfast      vitamin B-12 500 MCG tablet  Commonly known as: CYANOCOBALAMIN   500 mcg, Oral, Daily      vitamin E 200 UNIT capsule   200 Units, Oral, Daily           Discharge Diet:   Diet Instructions     Diet: Regular; Thin      Discharge Diet: Regular    Fluid Consistency: Thin        Activity at Discharge:   Activity Instructions     Activity as Tolerated          Discharge Care Plan/Instructions:   1.  Return for any acute or worsening symptoms  2.  Omnicef to total 7 days of antibiotic therapy.    Follow-up Appointments:   1.  Primary care provider in 1 week.  2.  Urology in 6 weeks with renal ultrasound.  3.  Gynecology if needed    Test Results Pending at Discharge: We will follow-up with results of urine culture.    Electronically signed by ALKA Nobles, 9/17/2020, 10:41 CDT.    Time: 25 minutes

## 2020-09-17 NOTE — PROGRESS NOTES
"Subjective    Ms. Peters is 88 y.o. female    Chief Complaint: Left hydronephrosis    History of Present Illness  Denies flank pain denies fevers passed a small ureteral stone has hematuria  The following portions of the patient's history were reviewed and updated as appropriate: allergies, current medications, past family history, past medical history, past social history, past surgical history and problem list.    Review of Systems   Constitutional: Negative for chills and fever.   Gastrointestinal: Negative for abdominal pain, anal bleeding and blood in stool.   Genitourinary: Negative for dysuria and hematuria.         Current Facility-Administered Medications:   •  acetaminophen (TYLENOL) tablet 650 mg, 650 mg, Oral, Q4H PRN, Paloma Omer DO  •  cefTRIAXone (ROCEPHIN) 2 g/100 mL 0.9% NS IVPB (MBP), 2 g, Intravenous, Q24H, Paloma Omer DO, 2 g at 09/16/20 2248  •  ondansetron (ZOFRAN) injection 4 mg, 4 mg, Intravenous, Q6H PRN, Paloma Omer DO  •  sodium chloride 0.9 % flush 10 mL, 10 mL, Intravenous, PRN, Paloma Omer DO  •  sodium chloride 0.9 % infusion, 75 mL/hr, Intravenous, Continuous, Paloma Omer DO, Last Rate: 75 mL/hr at 09/16/20 1846, 75 mL/hr at 09/16/20 1846    History reviewed. No pertinent past medical history.    No past surgical history on file.    Social History     Socioeconomic History   • Marital status:      Spouse name: Not on file   • Number of children: Not on file   • Years of education: Not on file   • Highest education level: Not on file   Tobacco Use   • Smoking status: Never Smoker   • Smokeless tobacco: Never Used       History reviewed. No pertinent family history.    Objective    /46 (BP Location: Left arm, Patient Position: Lying)   Pulse 63   Temp 98.5 °F (36.9 °C) (Oral)   Resp 16   Ht 160 cm (63\")   Wt 66.1 kg (145 lb 12.8 oz)   SpO2 95%   BMI 25.83 kg/m²     Intake/Output Summary (Last 24 hours) at 9/17/2020 0630  Last " data filed at 9/17/2020 0026  Gross per 24 hour   Intake 2807 ml   Output 1350 ml   Net 1457 ml       Physical Exam  Vitals signs reviewed.   Constitutional:       General: She is not in acute distress.     Appearance: She is well-developed. She is not diaphoretic.   Pulmonary:      Effort: Pulmonary effort is normal.   Abdominal:      General: There is no distension.      Palpations: Abdomen is soft. There is no mass.      Tenderness: There is no abdominal tenderness. There is no guarding or rebound.      Hernia: No hernia is present.   Skin:     General: Skin is warm and dry.   Neurological:      Mental Status: She is alert and oriented to person, place, and time.         Results Review:   I reviewed the patient's new clinical results.        Imaging Results (Last 24 Hours)     ** No results found for the last 24 hours. **            Results for orders placed or performed during the hospital encounter of 09/16/20   CBC Auto Differential    Specimen: Blood   Result Value Ref Range    WBC 8.72 3.40 - 10.80 10*3/mm3    RBC 4.02 3.77 - 5.28 10*6/mm3    Hemoglobin 10.5 (L) 12.0 - 15.9 g/dL    Hematocrit 33.2 (L) 34.0 - 46.6 %    MCV 82.6 79.0 - 97.0 fL    MCH 26.1 (L) 26.6 - 33.0 pg    MCHC 31.6 31.5 - 35.7 g/dL    RDW 16.2 (H) 12.3 - 15.4 %    RDW-SD 49.3 37.0 - 54.0 fl    MPV 10.7 6.0 - 12.0 fL    Platelets 255 140 - 450 10*3/mm3    Neutrophil % 78.0 (H) 42.7 - 76.0 %    Lymphocyte % 14.2 (L) 19.6 - 45.3 %    Monocyte % 6.7 5.0 - 12.0 %    Eosinophil % 0.1 (L) 0.3 - 6.2 %    Basophil % 0.5 0.0 - 1.5 %    Immature Grans % 0.5 0.0 - 0.5 %    Neutrophils, Absolute 6.81 1.70 - 7.00 10*3/mm3    Lymphocytes, Absolute 1.24 0.70 - 3.10 10*3/mm3    Monocytes, Absolute 0.58 0.10 - 0.90 10*3/mm3    Eosinophils, Absolute 0.01 0.00 - 0.40 10*3/mm3    Basophils, Absolute 0.04 0.00 - 0.20 10*3/mm3    Immature Grans, Absolute 0.04 0.00 - 0.05 10*3/mm3    nRBC 0.0 0.0 - 0.2 /100 WBC   Comprehensive Metabolic Panel    Specimen: Blood    Result Value Ref Range    Glucose 125 (H) 65 - 99 mg/dL    BUN 13 8 - 23 mg/dL    Creatinine 0.97 0.57 - 1.00 mg/dL    Sodium 139 136 - 145 mmol/L    Potassium 4.2 3.5 - 5.2 mmol/L    Chloride 104 98 - 107 mmol/L    CO2 25.0 22.0 - 29.0 mmol/L    Calcium 9.2 8.6 - 10.5 mg/dL    Total Protein 6.8 6.0 - 8.5 g/dL    Albumin 3.90 3.50 - 5.20 g/dL    ALT (SGPT) 10 1 - 33 U/L    AST (SGOT) 33 (H) 1 - 32 U/L    Alkaline Phosphatase 71 39 - 117 U/L    Total Bilirubin 0.3 0.0 - 1.2 mg/dL    eGFR Non African Amer 54 (L) >60 mL/min/1.73    Globulin 2.9 gm/dL    A/G Ratio 1.3 g/dL    BUN/Creatinine Ratio 13.4 7.0 - 25.0    Anion Gap 10.0 5.0 - 15.0 mmol/L   Procalcitonin    Specimen: Blood   Result Value Ref Range    Procalcitonin 0.08 0.00 - 0.25 ng/mL   Magnesium    Specimen: Blood   Result Value Ref Range    Magnesium 2.0 1.6 - 2.4 mg/dL   Urinalysis With Culture If Indicated - Urine, Catheter In/Out    Specimen: Urine, Catheter In/Out   Result Value Ref Range    Color, UA Yellow Yellow, Straw    Appearance, UA Cloudy (A) Clear    pH, UA 7.0 5.0 - 8.0    Specific Gravity, UA 1.009 1.005 - 1.030    Glucose, UA Negative Negative    Ketones, UA Negative Negative    Bilirubin, UA Negative Negative    Blood, UA Moderate (2+) (A) Negative    Protein, UA 30 mg/dL (1+) (A) Negative    Leuk Esterase, UA Large (3+) (A) Negative    Nitrite, UA Negative Negative    Urobilinogen, UA 0.2 E.U./dL 0.2 - 1.0 E.U./dL   Urinalysis, Microscopic Only - Urine, Catheter In/Out    Specimen: Urine, Catheter In/Out   Result Value Ref Range    RBC, UA 3-5 (A) None Seen /HPF    WBC, UA Too Numerous to Count (A) None Seen /HPF    Bacteria, UA Trace (A) None Seen /HPF    Squamous Epithelial Cells, UA None Seen None Seen, 0-2 /HPF    Yeast, UA Small/1+ Yeast None Seen /HPF    Hyaline Casts, UA None Seen None Seen /LPF    Methodology Manual Light Microscopy    CBC Auto Differential    Specimen: Blood   Result Value Ref Range    WBC 5.71 3.40 - 10.80  10*3/mm3    RBC 3.75 (L) 3.77 - 5.28 10*6/mm3    Hemoglobin 9.8 (L) 12.0 - 15.9 g/dL    Hematocrit 31.1 (L) 34.0 - 46.6 %    MCV 82.9 79.0 - 97.0 fL    MCH 26.1 (L) 26.6 - 33.0 pg    MCHC 31.5 31.5 - 35.7 g/dL    RDW 16.6 (H) 12.3 - 15.4 %    RDW-SD 49.7 37.0 - 54.0 fl    MPV 10.8 6.0 - 12.0 fL    Platelets 237 140 - 450 10*3/mm3    Neutrophil % 50.6 42.7 - 76.0 %    Lymphocyte % 35.9 19.6 - 45.3 %    Monocyte % 8.4 5.0 - 12.0 %    Eosinophil % 3.5 0.3 - 6.2 %    Basophil % 1.2 0.0 - 1.5 %    Immature Grans % 0.4 0.0 - 0.5 %    Neutrophils, Absolute 2.89 1.70 - 7.00 10*3/mm3    Lymphocytes, Absolute 2.05 0.70 - 3.10 10*3/mm3    Monocytes, Absolute 0.48 0.10 - 0.90 10*3/mm3    Eosinophils, Absolute 0.20 0.00 - 0.40 10*3/mm3    Basophils, Absolute 0.07 0.00 - 0.20 10*3/mm3    Immature Grans, Absolute 0.02 0.00 - 0.05 10*3/mm3    nRBC 0.0 0.0 - 0.2 /100 WBC     Assessment and Plan      UTI (urinary tract infection)    Ureteral stone with hydronephrosis     Complete apical prolapse  Left-sided hydronephrosis chronic  Urinary tract infection    Reviewed gynecology's evaluation.  Certainly I have seen cases where prolapse has caused kinking of the ureter and obstruction of the kidney.  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  This is not an acute issue.  She has passed a small ureteral stone.    She is nontoxic she is free of flank pain and has no evidence of acute kidney injury.  I do not see the benefit in placing a ureteral stent in her as she is completely asymptomatic.  He has opted for observation.  Placing the ureteral stent will not correct the underlying issue.  I recommend culture specific antibiotics.  She can go home from urology standpoint.  She can follow-up with me in 6 weeks with renal ultrasound.

## 2020-09-22 ENCOUNTER — TELEPHONE (OUTPATIENT)
Dept: UROLOGY | Facility: CLINIC | Age: 85
End: 2020-09-22

## 2023-05-01 ENCOUNTER — APPOINTMENT (OUTPATIENT)
Dept: CT IMAGING | Age: 88
DRG: 517 | End: 2023-05-01
Payer: MEDICARE

## 2023-05-01 ENCOUNTER — HOSPITAL ENCOUNTER (INPATIENT)
Age: 88
LOS: 1 days | Discharge: HOME | DRG: 517 | End: 2023-05-04
Attending: STUDENT IN AN ORGANIZED HEALTH CARE EDUCATION/TRAINING PROGRAM | Admitting: STUDENT IN AN ORGANIZED HEALTH CARE EDUCATION/TRAINING PROGRAM
Payer: MEDICARE

## 2023-05-01 DIAGNOSIS — S32.000A LUMBAR COMPRESSION FRACTURE, CLOSED, INITIAL ENCOUNTER (HCC): ICD-10-CM

## 2023-05-01 DIAGNOSIS — N17.9 AKI (ACUTE KIDNEY INJURY) (HCC): Primary | ICD-10-CM

## 2023-05-01 DIAGNOSIS — S32.040A COMPRESSION FRACTURE OF L4 LUMBAR VERTEBRA, CLOSED, INITIAL ENCOUNTER (HCC): ICD-10-CM

## 2023-05-01 PROBLEM — N28.9 RENAL INSUFFICIENCY: Status: ACTIVE | Noted: 2023-05-01

## 2023-05-01 LAB
ALBUMIN SERPL-MCNC: 4.5 G/DL (ref 3.5–5.2)
ALP SERPL-CCNC: 93 U/L (ref 35–104)
ALT SERPL-CCNC: 18 U/L (ref 5–33)
ANION GAP SERPL CALCULATED.3IONS-SCNC: 11 MMOL/L (ref 7–19)
AST SERPL-CCNC: 35 U/L (ref 5–32)
BASOPHILS # BLD: 0.1 K/UL (ref 0–0.2)
BASOPHILS NFR BLD: 0.7 % (ref 0–1)
BILIRUB SERPL-MCNC: 0.3 MG/DL (ref 0.2–1.2)
BILIRUB UR QL STRIP: NEGATIVE
BUN SERPL-MCNC: 24 MG/DL (ref 8–23)
CALCIUM SERPL-MCNC: 9.9 MG/DL (ref 8.2–9.6)
CHLORIDE SERPL-SCNC: 101 MMOL/L (ref 98–111)
CLARITY UR: CLEAR
CO2 SERPL-SCNC: 24 MMOL/L (ref 22–29)
COLOR UR: YELLOW
CREAT SERPL-MCNC: 1.2 MG/DL (ref 0.5–0.9)
EOSINOPHIL # BLD: 0.1 K/UL (ref 0–0.6)
EOSINOPHIL NFR BLD: 1 % (ref 0–5)
ERYTHROCYTE [DISTWIDTH] IN BLOOD BY AUTOMATED COUNT: 17.2 % (ref 11.5–14.5)
GLUCOSE SERPL-MCNC: 99 MG/DL (ref 74–109)
GLUCOSE UR STRIP.AUTO-MCNC: NEGATIVE MG/DL
HCT VFR BLD AUTO: 37.5 % (ref 37–47)
HGB BLD-MCNC: 11.2 G/DL (ref 12–16)
HGB UR STRIP.AUTO-MCNC: NEGATIVE MG/L
IMM GRANULOCYTES # BLD: 0 K/UL
KETONES UR STRIP.AUTO-MCNC: NEGATIVE MG/DL
LEUKOCYTE ESTERASE UR QL STRIP.AUTO: NEGATIVE
LYMPHOCYTES # BLD: 1.9 K/UL (ref 1.1–4.5)
LYMPHOCYTES NFR BLD: 21.5 % (ref 20–40)
MCH RBC QN AUTO: 23.8 PG (ref 27–31)
MCHC RBC AUTO-ENTMCNC: 29.9 G/DL (ref 33–37)
MCV RBC AUTO: 79.6 FL (ref 81–99)
MONOCYTES # BLD: 0.6 K/UL (ref 0–0.9)
MONOCYTES NFR BLD: 6.5 % (ref 0–10)
NEUTROPHILS # BLD: 6.2 K/UL (ref 1.5–7.5)
NEUTS SEG NFR BLD: 69.9 % (ref 50–65)
NITRITE UR QL STRIP.AUTO: NEGATIVE
PH UR STRIP.AUTO: 7 [PH] (ref 5–8)
PLATELET # BLD AUTO: 460 K/UL (ref 130–400)
PMV BLD AUTO: 9.7 FL (ref 9.4–12.3)
POTASSIUM SERPL-SCNC: 3.8 MMOL/L (ref 3.5–5)
PROT SERPL-MCNC: 7.9 G/DL (ref 6.6–8.7)
PROT UR STRIP.AUTO-MCNC: NEGATIVE MG/DL
RBC # BLD AUTO: 4.71 M/UL (ref 4.2–5.4)
SODIUM SERPL-SCNC: 136 MMOL/L (ref 136–145)
SP GR UR STRIP.AUTO: 1.01 (ref 1–1.03)
UROBILINOGEN UR STRIP.AUTO-MCNC: 0.2 E.U./DL
WBC # BLD AUTO: 8.9 K/UL (ref 4.8–10.8)

## 2023-05-01 PROCEDURE — 84300 ASSAY OF URINE SODIUM: CPT

## 2023-05-01 PROCEDURE — 6360000002 HC RX W HCPCS

## 2023-05-01 PROCEDURE — 87205 SMEAR GRAM STAIN: CPT

## 2023-05-01 PROCEDURE — 85025 COMPLETE CBC W/AUTO DIFF WBC: CPT

## 2023-05-01 PROCEDURE — 82570 ASSAY OF URINE CREATININE: CPT

## 2023-05-01 PROCEDURE — 72128 CT CHEST SPINE W/O DYE: CPT

## 2023-05-01 PROCEDURE — 36415 COLL VENOUS BLD VENIPUNCTURE: CPT

## 2023-05-01 PROCEDURE — 80053 COMPREHEN METABOLIC PANEL: CPT

## 2023-05-01 PROCEDURE — 94760 N-INVAS EAR/PLS OXIMETRY 1: CPT

## 2023-05-01 PROCEDURE — 99285 EMERGENCY DEPT VISIT HI MDM: CPT

## 2023-05-01 PROCEDURE — 84540 ASSAY OF URINE/UREA-N: CPT

## 2023-05-01 PROCEDURE — 96375 TX/PRO/DX INJ NEW DRUG ADDON: CPT

## 2023-05-01 PROCEDURE — 72131 CT LUMBAR SPINE W/O DYE: CPT

## 2023-05-01 PROCEDURE — G0378 HOSPITAL OBSERVATION PER HR: HCPCS

## 2023-05-01 PROCEDURE — 2580000003 HC RX 258

## 2023-05-01 PROCEDURE — 81003 URINALYSIS AUTO W/O SCOPE: CPT

## 2023-05-01 RX ORDER — FERROUS SULFATE 325(65) MG
325 TABLET ORAL
Status: DISCONTINUED | OUTPATIENT
Start: 2023-05-02 | End: 2023-05-04 | Stop reason: HOSPADM

## 2023-05-01 RX ORDER — ENOXAPARIN SODIUM 100 MG/ML
30 INJECTION SUBCUTANEOUS DAILY
Status: DISCONTINUED | OUTPATIENT
Start: 2023-05-01 | End: 2023-05-03

## 2023-05-01 RX ORDER — SODIUM CHLORIDE 0.9 % (FLUSH) 0.9 %
5-40 SYRINGE (ML) INJECTION EVERY 12 HOURS SCHEDULED
Status: DISCONTINUED | OUTPATIENT
Start: 2023-05-01 | End: 2023-05-04 | Stop reason: HOSPADM

## 2023-05-01 RX ORDER — CYCLOBENZAPRINE HCL 10 MG
10 TABLET ORAL NIGHTLY PRN
COMMUNITY

## 2023-05-01 RX ORDER — CHLORAL HYDRATE 500 MG
2 CAPSULE ORAL DAILY
Status: DISCONTINUED | OUTPATIENT
Start: 2023-05-02 | End: 2023-05-01

## 2023-05-01 RX ORDER — POLYETHYLENE GLYCOL 3350 17 G/17G
17 POWDER, FOR SOLUTION ORAL DAILY PRN
Status: DISCONTINUED | OUTPATIENT
Start: 2023-05-01 | End: 2023-05-04 | Stop reason: HOSPADM

## 2023-05-01 RX ORDER — ACETAMINOPHEN 325 MG/1
650 TABLET ORAL EVERY 6 HOURS PRN
Status: DISCONTINUED | OUTPATIENT
Start: 2023-05-01 | End: 2023-05-04 | Stop reason: HOSPADM

## 2023-05-01 RX ORDER — SODIUM CHLORIDE 9 MG/ML
INJECTION, SOLUTION INTRAVENOUS CONTINUOUS
Status: DISCONTINUED | OUTPATIENT
Start: 2023-05-01 | End: 2023-05-04 | Stop reason: HOSPADM

## 2023-05-01 RX ORDER — TRAMADOL HYDROCHLORIDE 50 MG/1
25 TABLET ORAL EVERY 6 HOURS PRN
Status: DISCONTINUED | OUTPATIENT
Start: 2023-05-01 | End: 2023-05-04 | Stop reason: HOSPADM

## 2023-05-01 RX ORDER — CYCLOBENZAPRINE HCL 10 MG
10 TABLET ORAL NIGHTLY PRN
Status: DISCONTINUED | OUTPATIENT
Start: 2023-05-02 | End: 2023-05-04 | Stop reason: HOSPADM

## 2023-05-01 RX ORDER — ACETAMINOPHEN 650 MG/1
650 SUPPOSITORY RECTAL EVERY 6 HOURS PRN
Status: DISCONTINUED | OUTPATIENT
Start: 2023-05-01 | End: 2023-05-04 | Stop reason: HOSPADM

## 2023-05-01 RX ORDER — SODIUM CHLORIDE 0.9 % (FLUSH) 0.9 %
5-40 SYRINGE (ML) INJECTION PRN
Status: DISCONTINUED | OUTPATIENT
Start: 2023-05-01 | End: 2023-05-04 | Stop reason: HOSPADM

## 2023-05-01 RX ORDER — TRAMADOL HYDROCHLORIDE 50 MG/1
50 TABLET ORAL EVERY 6 HOURS PRN
Status: DISCONTINUED | OUTPATIENT
Start: 2023-05-01 | End: 2023-05-04 | Stop reason: HOSPADM

## 2023-05-01 RX ORDER — ONDANSETRON 4 MG/1
4 TABLET, ORALLY DISINTEGRATING ORAL EVERY 8 HOURS PRN
Status: DISCONTINUED | OUTPATIENT
Start: 2023-05-01 | End: 2023-05-04 | Stop reason: HOSPADM

## 2023-05-01 RX ORDER — HYDROMORPHONE HYDROCHLORIDE 1 MG/ML
0.25 INJECTION, SOLUTION INTRAMUSCULAR; INTRAVENOUS; SUBCUTANEOUS EVERY 4 HOURS PRN
Status: DISCONTINUED | OUTPATIENT
Start: 2023-05-01 | End: 2023-05-04 | Stop reason: HOSPADM

## 2023-05-01 RX ORDER — SODIUM CHLORIDE 9 MG/ML
INJECTION, SOLUTION INTRAVENOUS PRN
Status: DISCONTINUED | OUTPATIENT
Start: 2023-05-01 | End: 2023-05-04 | Stop reason: HOSPADM

## 2023-05-01 RX ORDER — ONDANSETRON 2 MG/ML
4 INJECTION INTRAMUSCULAR; INTRAVENOUS EVERY 6 HOURS PRN
Status: DISCONTINUED | OUTPATIENT
Start: 2023-05-01 | End: 2023-05-04 | Stop reason: HOSPADM

## 2023-05-01 RX ORDER — 0.9 % SODIUM CHLORIDE 0.9 %
500 INTRAVENOUS SOLUTION INTRAVENOUS ONCE
Status: DISCONTINUED | OUTPATIENT
Start: 2023-05-01 | End: 2023-05-03 | Stop reason: ALTCHOICE

## 2023-05-01 RX ADMIN — SODIUM CHLORIDE: 9 INJECTION, SOLUTION INTRAVENOUS at 22:16

## 2023-05-01 RX ADMIN — SODIUM CHLORIDE, PRESERVATIVE FREE 10 ML: 5 INJECTION INTRAVENOUS at 22:17

## 2023-05-01 RX ADMIN — ENOXAPARIN SODIUM 30 MG: 100 INJECTION SUBCUTANEOUS at 18:36

## 2023-05-01 ASSESSMENT — ENCOUNTER SYMPTOMS
BACK PAIN: 1
ABDOMINAL SWELLING: 0
BOWEL INCONTINENCE: 0

## 2023-05-01 NOTE — H&P
[facility] - History & Physical      PCP: Huber Haddad MD    Date of Admission: 5/1/2023    Date of Service: 5/1/2023    Chief Complaint: Back pain    History Of Present Illness: The patient is a 80 y.o. female who presented to [facility] ER with PMH osteoporosis, s/p T11 -T12 kyphoplasty complaining of back pain. Patient states she has had ongoing lower back pain for the past 3 weeks. She states she has had limited mobility when normally she is independent with no restrictions. She denies recent fall or trauma. Denies recent illness, fever, chills, abdominal pain, shortness of breath, and chest pain. Denies lightheadedness or syncopal event. Denies radiation to lower extremities, numbness or tingling. Denies loss of bowel or bladder incontinence. Work-up in ER CT lumbar/thoracic spine profound osteopenia, age-indeterminate biconcave compression fracture L1, L2, L3, and possible L4, urinalysis negative, creatinine 1.2 BUN 24. Patient is to be observed by hospitalist service with consultation to neurosurgery. Past Medical History:        Diagnosis Date    Back pain     Fracture of thoracic vertebra with delayed healing     T12    Osteoarthritis     Osteoporosis        Past Surgical History:        Procedure Laterality Date    BACK SURGERY      FRACTURE REPAIR OF T12    HC INJECT OTHER PERPHRL NERV Right 4/21/2017    INJECTION MEDICATION WITH FLUOROSCOPIC GUIDANCE  performed by Ivory Briceño MD at [address] [place] [address] 9/12/2016    Kyphoplasty T12 performed by Briseyda Zepeda DO at [address] [place] [address] 11/11/2016    KYPHOPLASTY of T11 performed by Briseyda Zepeda DO at [facility] 6/28/2017    HIP TOTAL ARTHROPLASTY  ANT/SUPINE performed by Ivory Briceño MD at [address] [place] [address] Medications:  Prior to Admission medications    Medication Sig Start Date End Date Taking?  Authorizing Provider   cyclobenzaprine (FLEXERIL) 10 MG tablet

## 2023-05-01 NOTE — ED PROVIDER NOTES
[facility]  eMERGENCY dEPARTMENT eNCOUnter      Pt Name: Hali Medina  MRN: [id number]  [place] 4/19/1932  Date of evaluation: 5/1/2023  Provider: Sanford Badillo, [address]       Chief Complaint   Patient presents with    Back Pain     X2-3 weeks         HISTORY OF PRESENT ILLNESS   (Location/Symptom, Timing/Onset,Context/Setting, Quality, Duration, Modifying Factors, Severity)  Note limiting factors. Hali Medina is a 80 y.o. female who presents to the emergency department with right flank pain x 2 weeks. Was seen at an outside hospital and had an xray that was neg. Not able to get around like usual.  No radiation to her leg. No fever or vomiting,  no known injury. Lives alone    The history is provided by the patient. Back Pain  Location:  Lumbar spine  Radiates to:  Does not radiate  Pain severity:  Moderate  Onset quality:  Sudden  Duration:  2 weeks  Timing:  Constant  Chronicity:  New  Context: not falling and not recent injury    Associated symptoms: no abdominal swelling, no bladder incontinence, no bowel incontinence and no dysuria    Risk factors: hx of osteoporosis      NursingNotes were reviewed. REVIEW OF SYSTEMS    (2-9 systems for level 4, 10 or more for level 5)     Review of Systems   Gastrointestinal:  Negative for bowel incontinence. Genitourinary:  Negative for bladder incontinence, difficulty urinating and dysuria. Musculoskeletal:  Positive for arthralgias, back pain and myalgias. Except as noted above the remainder of the review of systems was reviewed and negative.        PAST MEDICAL HISTORY     Past Medical History:   Diagnosis Date    Back pain     Fracture of thoracic vertebra with delayed healing     T12    Osteoarthritis     Osteoporosis          SURGICALHISTORY       Past Surgical History:   Procedure Laterality Date    BACK SURGERY      FRACTURE REPAIR OF T12    HC INJECT OTHER PERPHRL NERV Right 4/21/2017    INJECTION MEDICATION WITH

## 2023-05-01 NOTE — CARE COORDINATION
Case Management Assessment  Initial Evaluation    Date/Time of Evaluation: 5/1/2023 5:33 PM  Assessment Completed by: Eileen Naranjo    If patient is discharged prior to next notation, then this note serves as note for discharge by case management. Patient Name: Sai García                   YOB: 1932  Diagnosis: Lumbar compression fracture, closed, initial encounter ([facility] [address] [S32.000A]                   Date / Time: 5/1/2023 11:44 AM    Patient Admission Status: Observation   Readmission Risk (Low < 19, Mod (19-27), High > 27): No data recorded  Current PCP: Liliana Hubbard MD  PCP verified by CM? Yes    Chart Reviewed: Yes      History Provided by: Patient  Patient Orientation: Alert and Oriented    Patient Cognition: Alert    Hospitalization in the last 30 days (Readmission):  No    If yes, Readmission Assessment in  Navigator will be completed. Advance Directives:      Code Status: Prior   Patient's Primary Decision Maker is: Legal Next of Kin      Discharge Planning:    Patient lives with: Alone Type of Home: House  Primary Care Giver: Self  Patient Support Systems include: Family Members   Current Financial resources: Medicare  Current community resources: None  Current services prior to admission: None            Current DME:              Type of Home Care services:  None    ADLS  Prior functional level: Independent in ADLs/IADLs  Current functional level: Independent in ADLs/IADLs    PT AM-PAC:   /24  OT AM-PAC:   /24    Family can provide assistance at DC: Yes  Would you like Case Management to discuss the discharge plan with any other family members/significant others, and if so, who?  Yes  Plans to Return to Present Housing: Yes  Potential Assistance needed at discharge: N/A            Potential DME:    Patient expects to discharge to: [address] [place] [address] for transportation at discharge: Family    Financial    Payor: Anna Junior / Plan: Loni Fan / Product Type: *No Product

## 2023-05-02 ENCOUNTER — APPOINTMENT (OUTPATIENT)
Dept: MRI IMAGING | Age: 88
DRG: 517 | End: 2023-05-02
Payer: MEDICARE

## 2023-05-02 PROBLEM — Z51.5 PALLIATIVE CARE PATIENT: Status: ACTIVE | Noted: 2023-05-02

## 2023-05-02 LAB
ANION GAP SERPL CALCULATED.3IONS-SCNC: 10 MMOL/L (ref 7–19)
BUN SERPL-MCNC: 18 MG/DL (ref 8–23)
CALCIUM SERPL-MCNC: 9.3 MG/DL (ref 8.2–9.6)
CHLORIDE SERPL-SCNC: 108 MMOL/L (ref 98–111)
CO2 SERPL-SCNC: 22 MMOL/L (ref 22–29)
CREAT SERPL-MCNC: 1.1 MG/DL (ref 0.5–0.9)
CREAT UR-MCNC: 37 MG/DL (ref 4.2–622)
EOSINOPHIL URNS QL MICRO: NORMAL
ERYTHROCYTE [DISTWIDTH] IN BLOOD BY AUTOMATED COUNT: 17.3 % (ref 11.5–14.5)
GLUCOSE SERPL-MCNC: 90 MG/DL (ref 74–109)
HCT VFR BLD AUTO: 32.9 % (ref 37–47)
HGB BLD-MCNC: 9.8 G/DL (ref 12–16)
MCH RBC QN AUTO: 24.1 PG (ref 27–31)
MCHC RBC AUTO-ENTMCNC: 29.8 G/DL (ref 33–37)
MCV RBC AUTO: 80.8 FL (ref 81–99)
PLATELET # BLD AUTO: 384 K/UL (ref 130–400)
PMV BLD AUTO: 10.5 FL (ref 9.4–12.3)
POTASSIUM SERPL-SCNC: 4.1 MMOL/L (ref 3.5–5)
RBC # BLD AUTO: 4.07 M/UL (ref 4.2–5.4)
SODIUM SERPL-SCNC: 140 MMOL/L (ref 136–145)
SODIUM UR-SCNC: 42 MMOL/L
UUN UR-MCNC: 327 MG/DL
WBC # BLD AUTO: 6.5 K/UL (ref 4.8–10.8)

## 2023-05-02 PROCEDURE — 94760 N-INVAS EAR/PLS OXIMETRY 1: CPT

## 2023-05-02 PROCEDURE — 6360000002 HC RX W HCPCS

## 2023-05-02 PROCEDURE — G0378 HOSPITAL OBSERVATION PER HR: HCPCS

## 2023-05-02 PROCEDURE — 72148 MRI LUMBAR SPINE W/O DYE: CPT

## 2023-05-02 PROCEDURE — 96375 TX/PRO/DX INJ NEW DRUG ADDON: CPT

## 2023-05-02 PROCEDURE — 6370000000 HC RX 637 (ALT 250 FOR IP)

## 2023-05-02 PROCEDURE — 99204 OFFICE O/P NEW MOD 45 MIN: CPT | Performed by: NEUROLOGICAL SURGERY

## 2023-05-02 PROCEDURE — 85027 COMPLETE CBC AUTOMATED: CPT

## 2023-05-02 PROCEDURE — G0378 HOSPITAL OBSERVATION PER HR: HCPCS | Performed by: STUDENT IN AN ORGANIZED HEALTH CARE EDUCATION/TRAINING PROGRAM

## 2023-05-02 PROCEDURE — 80048 BASIC METABOLIC PNL TOTAL CA: CPT

## 2023-05-02 PROCEDURE — 36415 COLL VENOUS BLD VENIPUNCTURE: CPT

## 2023-05-02 RX ADMIN — TRAMADOL HYDROCHLORIDE 25 MG: 50 TABLET, COATED ORAL at 22:20

## 2023-05-02 RX ADMIN — ENOXAPARIN SODIUM 30 MG: 100 INJECTION SUBCUTANEOUS at 08:56

## 2023-05-02 RX ADMIN — FERROUS SULFATE TAB 325 MG (65 MG ELEMENTAL FE) 325 MG: 325 (65 FE) TAB at 08:56

## 2023-05-02 ASSESSMENT — PAIN SCALES - GENERAL: PAINLEVEL_OUTOF10: 6

## 2023-05-02 NOTE — CONSULTS
following for support.     Palliative Performance Scale:        [de-identified]                     Electronically signed by Donte Coronado RN on 5/2/2023 at 1:50 PM
T11 and T12 kyphoplasty procedures with a new 3-week history of severe low back pain. CTs did not clearly determine if she has developed new fractures or progression of previous fractures in her lumbar spine. She is notably neurologically intact    RECOMMENDATIONS:    We will obtain a MRI of the lumbar spine to determine if any of the compression fractures at L1, L2 or L3 are acute in nature. I discussed the various treatment options with Mrs. Trupti Maldonado and her family at bedside. I explained  LSO bracing alone versus more aggressive surgical intervention. At this point, the patient and the family are undecided on how they would like to proceed. The MRI will reveal whether or not she is a surgical candidate. Final recommendations to follow once MRI is completed. In the meantime, we will obtain a LSO brace. When she has a brace she is cleared out of bed with assistance from a neurosurgical standpoint. On Lovenox for DVT prophylaxis  Dilaudid at 0.25 mg every 4 hours as needed pain.       Simone Rivers,

## 2023-05-03 ENCOUNTER — ANESTHESIA (OUTPATIENT)
Dept: OPERATING ROOM | Age: 88
DRG: 517 | End: 2023-05-03
Payer: MEDICARE

## 2023-05-03 ENCOUNTER — APPOINTMENT (OUTPATIENT)
Dept: GENERAL RADIOLOGY | Age: 88
DRG: 517 | End: 2023-05-03
Payer: MEDICARE

## 2023-05-03 ENCOUNTER — ANESTHESIA EVENT (OUTPATIENT)
Dept: OPERATING ROOM | Age: 88
DRG: 517 | End: 2023-05-03
Payer: MEDICARE

## 2023-05-03 LAB
ANION GAP SERPL CALCULATED.3IONS-SCNC: 11 MMOL/L (ref 7–19)
BUN SERPL-MCNC: 15 MG/DL (ref 8–23)
CALCIUM SERPL-MCNC: 9 MG/DL (ref 8.2–9.6)
CHLORIDE SERPL-SCNC: 108 MMOL/L (ref 98–111)
CO2 SERPL-SCNC: 21 MMOL/L (ref 22–29)
CREAT SERPL-MCNC: 1 MG/DL (ref 0.5–0.9)
ERYTHROCYTE [DISTWIDTH] IN BLOOD BY AUTOMATED COUNT: 17.2 % (ref 11.5–14.5)
GLUCOSE SERPL-MCNC: 90 MG/DL (ref 74–109)
HCT VFR BLD AUTO: 33.5 % (ref 37–47)
HGB BLD-MCNC: 10 G/DL (ref 12–16)
MCH RBC QN AUTO: 24.2 PG (ref 27–31)
MCHC RBC AUTO-ENTMCNC: 29.9 G/DL (ref 33–37)
MCV RBC AUTO: 80.9 FL (ref 81–99)
PLATELET # BLD AUTO: 380 K/UL (ref 130–400)
PMV BLD AUTO: 10.5 FL (ref 9.4–12.3)
POTASSIUM SERPL-SCNC: 4.1 MMOL/L (ref 3.5–5)
RBC # BLD AUTO: 4.14 M/UL (ref 4.2–5.4)
SODIUM SERPL-SCNC: 140 MMOL/L (ref 136–145)
WBC # BLD AUTO: 6.2 K/UL (ref 4.8–10.8)

## 2023-05-03 PROCEDURE — 0QS03ZZ REPOSITION LUMBAR VERTEBRA, PERCUTANEOUS APPROACH: ICD-10-PCS | Performed by: NEUROLOGICAL SURGERY

## 2023-05-03 PROCEDURE — 3600000005 HC SURGERY LEVEL 5 BASE: Performed by: NEUROLOGICAL SURGERY

## 2023-05-03 PROCEDURE — 72100 X-RAY EXAM L-S SPINE 2/3 VWS: CPT

## 2023-05-03 PROCEDURE — 94760 N-INVAS EAR/PLS OXIMETRY 1: CPT

## 2023-05-03 PROCEDURE — 2580000003 HC RX 258: Performed by: ANESTHESIOLOGY

## 2023-05-03 PROCEDURE — 1210000000 HC MED SURG R&B

## 2023-05-03 PROCEDURE — 22514 PERQ VERTEBRAL AUGMENTATION: CPT | Performed by: NEUROLOGICAL SURGERY

## 2023-05-03 PROCEDURE — 36415 COLL VENOUS BLD VENIPUNCTURE: CPT

## 2023-05-03 PROCEDURE — 2580000003 HC RX 258: Performed by: NEUROLOGICAL SURGERY

## 2023-05-03 PROCEDURE — 3700000000 HC ANESTHESIA ATTENDED CARE: Performed by: NEUROLOGICAL SURGERY

## 2023-05-03 PROCEDURE — 80048 BASIC METABOLIC PNL TOTAL CA: CPT

## 2023-05-03 PROCEDURE — 7100000000 HC PACU RECOVERY - FIRST 15 MIN: Performed by: NEUROLOGICAL SURGERY

## 2023-05-03 PROCEDURE — 0QU03JZ SUPPLEMENT LUMBAR VERTEBRA WITH SYNTHETIC SUBSTITUTE, PERCUTANEOUS APPROACH: ICD-10-PCS | Performed by: NEUROLOGICAL SURGERY

## 2023-05-03 PROCEDURE — 99233 SBSQ HOSP IP/OBS HIGH 50: CPT | Performed by: NEUROLOGICAL SURGERY

## 2023-05-03 PROCEDURE — 2720000010 HC SURG SUPPLY STERILE: Performed by: NEUROLOGICAL SURGERY

## 2023-05-03 PROCEDURE — 6360000002 HC RX W HCPCS: Performed by: NEUROLOGICAL SURGERY

## 2023-05-03 PROCEDURE — 6370000000 HC RX 637 (ALT 250 FOR IP): Performed by: NEUROLOGICAL SURGERY

## 2023-05-03 PROCEDURE — 93005 ELECTROCARDIOGRAM TRACING: CPT

## 2023-05-03 PROCEDURE — 7100000001 HC PACU RECOVERY - ADDTL 15 MIN: Performed by: NEUROLOGICAL SURGERY

## 2023-05-03 PROCEDURE — 3600000015 HC SURGERY LEVEL 5 ADDTL 15MIN: Performed by: NEUROLOGICAL SURGERY

## 2023-05-03 PROCEDURE — 85027 COMPLETE CBC AUTOMATED: CPT

## 2023-05-03 PROCEDURE — 6360000004 HC RX CONTRAST MEDICATION: Performed by: NEUROLOGICAL SURGERY

## 2023-05-03 PROCEDURE — 2580000003 HC RX 258

## 2023-05-03 PROCEDURE — 3700000001 HC ADD 15 MINUTES (ANESTHESIA): Performed by: NEUROLOGICAL SURGERY

## 2023-05-03 PROCEDURE — 2709999900 HC NON-CHARGEABLE SUPPLY: Performed by: NEUROLOGICAL SURGERY

## 2023-05-03 PROCEDURE — 2500000003 HC RX 250 WO HCPCS: Performed by: NURSE ANESTHETIST, CERTIFIED REGISTERED

## 2023-05-03 PROCEDURE — 6360000002 HC RX W HCPCS: Performed by: NURSE ANESTHETIST, CERTIFIED REGISTERED

## 2023-05-03 PROCEDURE — C1713 ANCHOR/SCREW BN/BN,TIS/BN: HCPCS | Performed by: NEUROLOGICAL SURGERY

## 2023-05-03 DEVICE — BONE CEMENT C01B HV-R WITH MIXER  US
Type: IMPLANTABLE DEVICE | Site: BACK | Status: FUNCTIONAL
Brand: KYPHON® HV-R® BONE CEMENT AND KYPHON® MIXER PACK

## 2023-05-03 DEVICE — CEMENT C01A KYPHX HV-R BONE CEMENT EN
Type: IMPLANTABLE DEVICE | Site: BACK | Status: FUNCTIONAL
Brand: KYPHON® HV-R® BONE CEMENT

## 2023-05-03 RX ORDER — SODIUM CHLORIDE 0.9 % (FLUSH) 0.9 %
5-40 SYRINGE (ML) INJECTION EVERY 12 HOURS SCHEDULED
Status: DISCONTINUED | OUTPATIENT
Start: 2023-05-03 | End: 2023-05-03 | Stop reason: HOSPADM

## 2023-05-03 RX ORDER — MIDAZOLAM HYDROCHLORIDE 2 MG/2ML
2 INJECTION, SOLUTION INTRAMUSCULAR; INTRAVENOUS
Status: DISCONTINUED | OUTPATIENT
Start: 2023-05-03 | End: 2023-05-03 | Stop reason: HOSPADM

## 2023-05-03 RX ORDER — SODIUM CHLORIDE 9 MG/ML
INJECTION, SOLUTION INTRAVENOUS PRN
Status: DISCONTINUED | OUTPATIENT
Start: 2023-05-03 | End: 2023-05-03 | Stop reason: HOSPADM

## 2023-05-03 RX ORDER — PROCHLORPERAZINE EDISYLATE 5 MG/ML
5 INJECTION INTRAMUSCULAR; INTRAVENOUS
Status: DISCONTINUED | OUTPATIENT
Start: 2023-05-03 | End: 2023-05-03 | Stop reason: HOSPADM

## 2023-05-03 RX ORDER — FENTANYL CITRATE 50 UG/ML
50 INJECTION, SOLUTION INTRAMUSCULAR; INTRAVENOUS
Status: DISCONTINUED | OUTPATIENT
Start: 2023-05-03 | End: 2023-05-03 | Stop reason: HOSPADM

## 2023-05-03 RX ORDER — SODIUM CHLORIDE, SODIUM LACTATE, POTASSIUM CHLORIDE, CALCIUM CHLORIDE 600; 310; 30; 20 MG/100ML; MG/100ML; MG/100ML; MG/100ML
INJECTION, SOLUTION INTRAVENOUS CONTINUOUS
Status: DISCONTINUED | OUTPATIENT
Start: 2023-05-03 | End: 2023-05-03 | Stop reason: HOSPADM

## 2023-05-03 RX ORDER — LIDOCAINE HYDROCHLORIDE 10 MG/ML
1 INJECTION, SOLUTION EPIDURAL; INFILTRATION; INTRACAUDAL; PERINEURAL
Status: DISCONTINUED | OUTPATIENT
Start: 2023-05-03 | End: 2023-05-03 | Stop reason: HOSPADM

## 2023-05-03 RX ORDER — FENTANYL CITRATE 50 UG/ML
25 INJECTION, SOLUTION INTRAMUSCULAR; INTRAVENOUS EVERY 5 MIN PRN
Status: DISCONTINUED | OUTPATIENT
Start: 2023-05-03 | End: 2023-05-03 | Stop reason: HOSPADM

## 2023-05-03 RX ORDER — ENOXAPARIN SODIUM 100 MG/ML
30 INJECTION SUBCUTANEOUS DAILY
Status: DISCONTINUED | OUTPATIENT
Start: 2023-05-04 | End: 2023-05-04 | Stop reason: HOSPADM

## 2023-05-03 RX ORDER — ONDANSETRON 2 MG/ML
INJECTION INTRAMUSCULAR; INTRAVENOUS PRN
Status: DISCONTINUED | OUTPATIENT
Start: 2023-05-03 | End: 2023-05-03 | Stop reason: SDUPTHER

## 2023-05-03 RX ORDER — LIDOCAINE HYDROCHLORIDE 10 MG/ML
INJECTION, SOLUTION INFILTRATION; PERINEURAL PRN
Status: DISCONTINUED | OUTPATIENT
Start: 2023-05-03 | End: 2023-05-03 | Stop reason: SDUPTHER

## 2023-05-03 RX ORDER — ONDANSETRON 2 MG/ML
4 INJECTION INTRAMUSCULAR; INTRAVENOUS
Status: DISCONTINUED | OUTPATIENT
Start: 2023-05-03 | End: 2023-05-03 | Stop reason: HOSPADM

## 2023-05-03 RX ORDER — PROPOFOL 10 MG/ML
INJECTION, EMULSION INTRAVENOUS PRN
Status: DISCONTINUED | OUTPATIENT
Start: 2023-05-03 | End: 2023-05-03 | Stop reason: SDUPTHER

## 2023-05-03 RX ORDER — EPHEDRINE SULFATE 50 MG/ML
INJECTION, SOLUTION INTRAVENOUS PRN
Status: DISCONTINUED | OUTPATIENT
Start: 2023-05-03 | End: 2023-05-03 | Stop reason: SDUPTHER

## 2023-05-03 RX ORDER — DIPHENHYDRAMINE HYDROCHLORIDE 50 MG/ML
12.5 INJECTION INTRAMUSCULAR; INTRAVENOUS
Status: DISCONTINUED | OUTPATIENT
Start: 2023-05-03 | End: 2023-05-03 | Stop reason: HOSPADM

## 2023-05-03 RX ORDER — SODIUM CHLORIDE 0.9 % (FLUSH) 0.9 %
5-40 SYRINGE (ML) INJECTION PRN
Status: DISCONTINUED | OUTPATIENT
Start: 2023-05-03 | End: 2023-05-03 | Stop reason: HOSPADM

## 2023-05-03 RX ORDER — FENTANYL CITRATE 50 UG/ML
25 INJECTION, SOLUTION INTRAMUSCULAR; INTRAVENOUS
Status: DISCONTINUED | OUTPATIENT
Start: 2023-05-03 | End: 2023-05-03 | Stop reason: HOSPADM

## 2023-05-03 RX ORDER — DEXAMETHASONE SODIUM PHOSPHATE 10 MG/ML
INJECTION, SOLUTION INTRAMUSCULAR; INTRAVENOUS PRN
Status: DISCONTINUED | OUTPATIENT
Start: 2023-05-03 | End: 2023-05-03 | Stop reason: SDUPTHER

## 2023-05-03 RX ORDER — FENTANYL CITRATE 50 UG/ML
50 INJECTION, SOLUTION INTRAMUSCULAR; INTRAVENOUS EVERY 5 MIN PRN
Status: DISCONTINUED | OUTPATIENT
Start: 2023-05-03 | End: 2023-05-03 | Stop reason: HOSPADM

## 2023-05-03 RX ORDER — FENTANYL CITRATE 50 UG/ML
INJECTION, SOLUTION INTRAMUSCULAR; INTRAVENOUS PRN
Status: DISCONTINUED | OUTPATIENT
Start: 2023-05-03 | End: 2023-05-03 | Stop reason: SDUPTHER

## 2023-05-03 RX ORDER — ROCURONIUM BROMIDE 10 MG/ML
INJECTION, SOLUTION INTRAVENOUS PRN
Status: DISCONTINUED | OUTPATIENT
Start: 2023-05-03 | End: 2023-05-03 | Stop reason: SDUPTHER

## 2023-05-03 RX ADMIN — EPHEDRINE SULFATE 20 MG: 50 INJECTION INTRAMUSCULAR; INTRAVENOUS; SUBCUTANEOUS at 15:08

## 2023-05-03 RX ADMIN — CEFAZOLIN 2000 MG: 2 INJECTION, POWDER, FOR SOLUTION INTRAMUSCULAR; INTRAVENOUS at 14:43

## 2023-05-03 RX ADMIN — LIDOCAINE HYDROCHLORIDE 50 MG: 10 INJECTION, SOLUTION INFILTRATION; PERINEURAL at 14:32

## 2023-05-03 RX ADMIN — ROCURONIUM BROMIDE 50 MG: 10 INJECTION, SOLUTION INTRAVENOUS at 14:32

## 2023-05-03 RX ADMIN — ACETAMINOPHEN 650 MG: 325 TABLET ORAL at 22:10

## 2023-05-03 RX ADMIN — SODIUM CHLORIDE, SODIUM LACTATE, POTASSIUM CHLORIDE, AND CALCIUM CHLORIDE: 600; 310; 30; 20 INJECTION, SOLUTION INTRAVENOUS at 15:14

## 2023-05-03 RX ADMIN — SUGAMMADEX 250 MG: 100 INJECTION, SOLUTION INTRAVENOUS at 15:24

## 2023-05-03 RX ADMIN — DEXAMETHASONE SODIUM PHOSPHATE 8 MG: 10 INJECTION, SOLUTION INTRAMUSCULAR; INTRAVENOUS at 14:50

## 2023-05-03 RX ADMIN — ONDANSETRON 4 MG: 2 INJECTION INTRAMUSCULAR; INTRAVENOUS at 15:23

## 2023-05-03 RX ADMIN — SODIUM CHLORIDE, PRESERVATIVE FREE 10 ML: 5 INJECTION INTRAVENOUS at 22:10

## 2023-05-03 RX ADMIN — TRAMADOL HYDROCHLORIDE 25 MG: 50 TABLET, COATED ORAL at 16:58

## 2023-05-03 RX ADMIN — FENTANYL CITRATE 25 MCG: 0.05 INJECTION, SOLUTION INTRAMUSCULAR; INTRAVENOUS at 14:55

## 2023-05-03 RX ADMIN — PROPOFOL 100 MG: 10 INJECTION, EMULSION INTRAVENOUS at 14:32

## 2023-05-03 RX ADMIN — SODIUM CHLORIDE, PRESERVATIVE FREE 10 ML: 5 INJECTION INTRAVENOUS at 12:13

## 2023-05-03 RX ADMIN — FENTANYL CITRATE 25 MCG: 0.05 INJECTION, SOLUTION INTRAMUSCULAR; INTRAVENOUS at 15:14

## 2023-05-03 RX ADMIN — SODIUM CHLORIDE, SODIUM LACTATE, POTASSIUM CHLORIDE, AND CALCIUM CHLORIDE: 600; 310; 30; 20 INJECTION, SOLUTION INTRAVENOUS at 13:37

## 2023-05-03 ASSESSMENT — PAIN DESCRIPTION - LOCATION: LOCATION: BACK

## 2023-05-03 ASSESSMENT — ENCOUNTER SYMPTOMS: SHORTNESS OF BREATH: 0

## 2023-05-03 ASSESSMENT — LIFESTYLE VARIABLES: SMOKING_STATUS: 0

## 2023-05-03 ASSESSMENT — PAIN DESCRIPTION - ORIENTATION: ORIENTATION: MID

## 2023-05-03 ASSESSMENT — PAIN SCALES - GENERAL: PAINLEVEL_OUTOF10: 6

## 2023-05-03 ASSESSMENT — PAIN DESCRIPTION - DESCRIPTORS: DESCRIPTORS: DISCOMFORT;ACHING

## 2023-05-03 NOTE — PLAN OF CARE
Problem: Safety - Adult  Goal: Free from fall injury  5/3/2023 0135 by Marcelo Fisher RN  Outcome: Progressing  5/3/2023 0134 by Marcelo Fisher RN  Outcome: Progressing  5/2/2023 1155 by Siena Epstein LPN  Outcome: Progressing     Problem: ABCDS Injury Assessment  Goal: Absence of physical injury  5/3/2023 0135 by Marcelo Fisher RN  Outcome: Progressing  5/2/2023 1155 by Siena Epstein LPN  Outcome: Progressing

## 2023-05-03 NOTE — BRIEF OP NOTE
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 Item Serial No.  Lot No. LRB No. Used Action   KIT CEMENT BONE COMBO Arnetha Washburn HV-R - [id number]  KIT CEMENT BONE COMBO Arnetha Washburn HV-R  [facility] [id number] N/A 1 Implanted   CEMENT BNE HI VISC RADIOPAQUE KYPHON HV-R - [id number]  CEMENT BNE HI VISC RADIOPAQUE KYPHON HV-R  [facility] [id number] N/A 1 Implanted         Drains: * No LDAs found *    Findings: Severe osteopenia. Bone density was very poor. No issues.         Electronically signed by Manpreet Roberts DO on 5/3/2023 at 3:34 PM

## 2023-05-03 NOTE — OP NOTE
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 The patient has a history of previous T11 and T12 compression fractures that resulted in kyphoplasty procedures by our team.  She denies any pain down to the lower extremities numbness or weakness. Patient had no focal deficits. Motor was 5 out of 5 all groups there was no sensation loss to light touch or pinprick. Reflexes were 1+ and symmetric. CT of the lumbar spine revealed compression fractures at L1, L2 and L3 of indeterminate age. An MRI was obtained and revealed an acute fracture of the L1 vertebral body. After thorough discussion regarding the treatment options for an acute compression fracture such as LSO bracing versus a more aggressive kyphoplasty procedure, the patient like to move forward surgical intervention. DESCRIPTION OF PROCEDURE    The patient was brought to the operating room where sedation was introduced. The patient was positioned on the open table in the prone position. All pressure points were carefully checked and padded. The back was clipped and prepared for 10 minutes with Betadine scrub, Betadine paint and DuraPrep. The patient was draped in the usual sterile fashion. The C-arm fluoroscope was draped. After the patient's skin was prepped and draped, the L1 level was localized fluoroscopically and marked. A 15 blade was used to create a small stab incision.   A pedicle accessing needle was inserted into the stab incision and using AP and lateral fluoroscopy navigated through the pedicle into the

## 2023-05-03 NOTE — CARE COORDINATION
05/03/23 1031   IMM Letter   IMM Letter given to Patient/Family/Significant other/Guardian/POA/by: Marcus Burns  (pt and son both gave verbal understanding)   IMM Letter date given: 05/03/23   IMM Letter time given: 5     Pt received copy of IMM and gave verbal understanding, as well as her son. Pt was laying down so did not require a written signature.      Electronically signed by Marcus Burns MBA, BSW on 5/3/2023 at 10:33 AM

## 2023-05-03 NOTE — ANESTHESIA PRE PROCEDURE
Palliative care patient 05/02/2023       Past Surgical History:        Procedure Laterality Date    BACK SURGERY      FRACTURE REPAIR OF T12    HC INJECT OTHER PERPHRL NERV Right 4/21/2017    INJECTION MEDICATION WITH FLUOROSCOPIC GUIDANCE  performed by Sunil Alcantara MD at [place] [address] N/A 9/12/2016    Kyphoplasty T12 performed by Simone Rivers DO at [address] N/A 11/11/2016    KYPHOPLASTY of T11 performed by Simone Rivers DO at [address] Right 6/28/2017    HIP TOTAL ARTHROPLASTY  ANT/SUPINE performed by Sunil Alcantara MD at [place] [address] History:    Social History     Tobacco Use    Smoking status: Never    Smokeless tobacco: Never   Substance Use Topics    Alcohol use: No                                Counseling given: Not Answered      Vital Signs (Current):   Vitals:    05/03/23 0840 05/03/23 1335 05/03/23 1345 05/03/23 1400   BP:  (!) 156/75     Pulse:  90 95 97   Resp:  21 18 19   Temp:       TempSrc:       SpO2: 98% 97% 95% 94%   Weight:       Height:                                                  BP Readings from Last 3 Encounters:   05/03/23 (!) 156/75   07/03/17 (!) 106/58   06/28/17 (!) 123/50       NPO Status: Time of last liquid consumption: 2350                        Time of last solid consumption: 2350                        Date of last liquid consumption: 05/02/23                        Date of last solid food consumption: 05/02/23    BMI:   Wt Readings from Last 3 Encounters:   05/01/23 141 lb 4 oz (64.1 kg)   05/02/23 141 lb (64 kg)   06/27/17 145 lb 3.2 oz (65.9 kg)     Body mass index is 27.59 kg/m².     CBC:   Lab Results   Component Value Date/Time    WBC 6.2 05/03/2023 03:19 AM    RBC 4.14 05/03/2023 03:19 AM    HGB 10.0 05/03/2023 03:19 AM    HCT 33.5 05/03/2023 03:19 AM    MCV 80.9 05/03/2023 03:19 AM    RDW 17.2 05/03/2023 03:19 AM     05/03/2023 03:19 AM       CMP:   Lab Results   Component Value

## 2023-05-04 VITALS
RESPIRATION RATE: 20 BRPM | OXYGEN SATURATION: 99 % | TEMPERATURE: 97.5 F | WEIGHT: 141.25 LBS | SYSTOLIC BLOOD PRESSURE: 134 MMHG | HEART RATE: 84 BPM | DIASTOLIC BLOOD PRESSURE: 58 MMHG | BODY MASS INDEX: 27.73 KG/M2 | HEIGHT: 60 IN

## 2023-05-04 LAB
ANION GAP SERPL CALCULATED.3IONS-SCNC: 12 MMOL/L (ref 7–19)
BUN SERPL-MCNC: 17 MG/DL (ref 8–23)
CALCIUM SERPL-MCNC: 9.3 MG/DL (ref 8.2–9.6)
CHLORIDE SERPL-SCNC: 107 MMOL/L (ref 98–111)
CO2 SERPL-SCNC: 21 MMOL/L (ref 22–29)
CREAT SERPL-MCNC: 1.1 MG/DL (ref 0.5–0.9)
ERYTHROCYTE [DISTWIDTH] IN BLOOD BY AUTOMATED COUNT: 17.1 % (ref 11.5–14.5)
GLUCOSE SERPL-MCNC: 145 MG/DL (ref 74–109)
HCT VFR BLD AUTO: 33.2 % (ref 37–47)
HGB BLD-MCNC: 10.2 G/DL (ref 12–16)
MCH RBC QN AUTO: 24.1 PG (ref 27–31)
MCHC RBC AUTO-ENTMCNC: 30.7 G/DL (ref 33–37)
MCV RBC AUTO: 78.5 FL (ref 81–99)
PLATELET # BLD AUTO: 362 K/UL (ref 130–400)
PMV BLD AUTO: 9.7 FL (ref 9.4–12.3)
POTASSIUM SERPL-SCNC: 4.4 MMOL/L (ref 3.5–5)
RBC # BLD AUTO: 4.23 M/UL (ref 4.2–5.4)
SODIUM SERPL-SCNC: 140 MMOL/L (ref 136–145)
WBC # BLD AUTO: 7.6 K/UL (ref 4.8–10.8)

## 2023-05-04 PROCEDURE — 80048 BASIC METABOLIC PNL TOTAL CA: CPT

## 2023-05-04 PROCEDURE — 97535 SELF CARE MNGMENT TRAINING: CPT

## 2023-05-04 PROCEDURE — 97161 PT EVAL LOW COMPLEX 20 MIN: CPT

## 2023-05-04 PROCEDURE — 85027 COMPLETE CBC AUTOMATED: CPT

## 2023-05-04 PROCEDURE — 36415 COLL VENOUS BLD VENIPUNCTURE: CPT

## 2023-05-04 PROCEDURE — 99024 POSTOP FOLLOW-UP VISIT: CPT | Performed by: NURSE PRACTITIONER

## 2023-05-04 PROCEDURE — 97530 THERAPEUTIC ACTIVITIES: CPT

## 2023-05-04 PROCEDURE — 6360000002 HC RX W HCPCS: Performed by: NEUROLOGICAL SURGERY

## 2023-05-04 PROCEDURE — 2580000003 HC RX 258: Performed by: NEUROLOGICAL SURGERY

## 2023-05-04 PROCEDURE — 97166 OT EVAL MOD COMPLEX 45 MIN: CPT

## 2023-05-04 PROCEDURE — 6370000000 HC RX 637 (ALT 250 FOR IP): Performed by: NEUROLOGICAL SURGERY

## 2023-05-04 PROCEDURE — 97116 GAIT TRAINING THERAPY: CPT

## 2023-05-04 RX ORDER — POLYETHYLENE GLYCOL 3350 17 G/17G
17 POWDER, FOR SOLUTION ORAL DAILY PRN
Qty: 527 G | Refills: 1 | Status: SHIPPED | OUTPATIENT
Start: 2023-05-04 | End: 2023-06-03

## 2023-05-04 RX ORDER — TRAMADOL HYDROCHLORIDE 50 MG/1
50 TABLET ORAL EVERY 6 HOURS PRN
Qty: 12 TABLET | Refills: 0 | Status: SHIPPED | OUTPATIENT
Start: 2023-05-04 | End: 2023-05-07

## 2023-05-04 RX ADMIN — SODIUM CHLORIDE: 9 INJECTION, SOLUTION INTRAVENOUS at 12:58

## 2023-05-04 RX ADMIN — ENOXAPARIN SODIUM 30 MG: 100 INJECTION SUBCUTANEOUS at 08:09

## 2023-05-04 RX ADMIN — FERROUS SULFATE TAB 325 MG (65 MG ELEMENTAL FE) 325 MG: 325 (65 FE) TAB at 08:09

## 2023-05-04 RX ADMIN — SODIUM CHLORIDE: 9 INJECTION, SOLUTION INTRAVENOUS at 02:43

## 2023-05-04 NOTE — PLAN OF CARE
Problem: Safety - Adult  Goal: Free from fall injury  5/4/2023 1512 by Anuja Deshpande RN  Outcome: Completed  5/4/2023 0235 by Doyle Gaucher, RN  Outcome: Progressing     Problem: ABCDS Injury Assessment  Goal: Absence of physical injury  5/4/2023 1512 by Anuja Deshpande RN  Outcome: Completed  5/4/2023 0235 by Doyle Gaucher, RN  Outcome: Progressing     Problem: Discharge Planning  Goal: Discharge to home or other facility with appropriate resources  5/4/2023 1512 by Anuja Deshpande RN  Outcome: Completed  5/4/2023 0235 by Doyle Gaucher, RN  Outcome: Progressing

## 2023-05-04 NOTE — DISCHARGE SUMMARY
narrowing. At L1-L2, there is no significant central canal or foraminal narrowing. INCLUDED ABDOMEN: Multiple bilateral renal cysts. 1.Acute fracture of the L1 vertebral body with approximately 30% loss of height. 2.Multilevel degenerative changes as detailed above. Medication List        START taking these medications      polyethylene glycol 17 g packet  Commonly known as: GLYCOLAX  Take 17 g by mouth daily as needed for Constipation     traMADol 50 MG tablet  Commonly known as: ULTRAM  Take 1 tablet by mouth every 6 hours as needed for Pain for up to 3 days. Max Daily Amount: 200 mg            CONTINUE taking these medications      CALCIUM CITRATE + D3 PO     cyclobenzaprine 10 MG tablet  Commonly known as: FLEXERIL     ferrous sulfate 325 (65 Fe) MG tablet  Commonly known as: IRON 325     fish oil 1000 MG capsule     Vitamin B-12 5000 MCG Subl               Where to Get Your Medications        These medications were sent to [address] [place] [address],Building [facility] [phone]  [address] [place] [address] [facility]      Phone: [phone]   polyethylene glycol 17 g packet  traMADol 50 MG tablet           ISSUES TO BE ADDRESSED AT HOSPITAL FOLLOW-UP VISIT:    Advised patient to follow-up closely with PCP upon discharge for management of chronic medical issues    Condition on Discharge: stable  Discharge Disposition: Home with [facility]    Recommended Follow Up:  Mary Interiano MD  [address] [place] [address]  [phone]    Go on 5/10/2023  AT 9:45 AM    Followup Appointments Scheduled at Time of Discharge:  No future appointments. Discharge Instructions:   Please see the discharge paperwork. Patient was seen at bedside today, and the examination shows improvement since yesterday. Diet Instructions:  ADULT DIET;  Regular     Detailed discharge directions delivered to the patient by myself and our nursing staff, who

## 2023-05-05 NOTE — PROGRESS NOTES
Daily Progress Note    Date:2023  Patient: Sai Level  : 1932  [id number]  CODE:Full Code No additional code details  Florida Fish MD    Admit Date: 2023 11:44 AM   LOS: 0 days     Chief Complaint   Patient presents with    Back Pain     X2-3 weeks         Subjective: Pt went for MRI this afternoon. Family is at bedside. She reports improvement in lower back pain. She was fitted for LSO brace and it helped quite a bit with her pain. She wants to take a shower. Would be interested in surgery if this was offered to her. Hospital Summary:  80 y.o. female who presented to [facility] ER with PMH osteoporosis, s/p T11 -T12 kyphoplasty complaining of back pain. Patient states she has had ongoing lower back pain for the past 3 weeks. She states she has had limited mobility when normally she is independent with no restrictions. She denies recent fall or trauma. Denies recent illness, fever, chills, abdominal pain, shortness of breath, and chest pain. Denies lightheadedness or syncopal event. Denies radiation to lower extremities, numbness or tingling. Denies loss of bowel or bladder incontinence. Work-up in ER CT lumbar/thoracic spine profound osteopenia, age-indeterminate biconcave compression fracture L1, L2, L3, and possible L4, urinalysis negative, creatinine 1.2 BUN 24. Pt observed by hospitalist service with consultation to neurosurgery. MRI lumbar spine pending. LSO brace provided to pt. Review of Systems   All other systems reviewed and are negative.     Objective:      Vital signs in last 24 hours:  Patient Vitals for the past 24 hrs:   BP Temp Temp src Pulse Resp SpO2 Weight   23 1003 (!) 142/59 97.5 °F (36.4 °C) Temporal 73 -- 97 % --   23 0834 -- -- -- -- -- 95 % --   23 1943 (!) 155/83 97.2 °F (36.2 °C) -- 80 18 96 % 141 lb 4 oz (64.1 kg)       I/O last 3 completed shifts:  In: -   Out: 500 [Urine:500]  I/O this shift:  In: 360 [P.O.:360]  Out: -
Daily Progress Note    Date:5/3/2023  Patient: Rita Wright  : 1932  [id number]  CODE:Full Code No additional code details  Anurag Beal MD    Admit Date: 2023 11:44 AM   LOS: 0 days     Chief Complaint   Patient presents with    Back Pain     X2-3 weeks         Subjective: Onetha Gull. Pain well controlled. Plan for OR this afternoon for Kyphoplasty. Hospital Summary:  80 y.o. female who presented to [facility] ER with PMH osteoporosis, s/p T11 -T12 kyphoplasty complaining of back pain. Patient states she has had ongoing lower back pain for the past 3 weeks. She states she has had limited mobility when normally she is independent with no restrictions. She denies recent fall or trauma. Denies recent illness, fever, chills, abdominal pain, shortness of breath, and chest pain. Denies lightheadedness or syncopal event. Denies radiation to lower extremities, numbness or tingling. Denies loss of bowel or bladder incontinence. Work-up in ER CT lumbar/thoracic spine profound osteopenia, age-indeterminate biconcave compression fracture L1, L2, L3, and possible L4, urinalysis negative, creatinine 1.2 BUN 24. Pt observed by hospitalist service with consultation to neurosurgery. MRI lumbar spine pending. LSO brace provided to pt. Review of Systems   All other systems reviewed and are negative. Objective:      Vital signs in last 24 hours:  Patient Vitals for the past 24 hrs:   BP Temp Temp src Pulse Resp SpO2   23 1400 -- -- -- 97 19 94 %   23 1345 -- -- -- 95 18 95 %   23 1335 (!) 156/75 -- -- 90 21 97 %   23 0840 -- -- -- -- -- 98 %   23 0804 (!) 143/54 (!) 96.3 °F (35.7 °C) Temporal 62 -- 98 %   23 (!) 142/65 98.4 °F (36.9 °C) Temporal 84 18 97 %         I/O last 3 completed shifts: In: 360 [P.O.:360]  Out: 1500 [Urine:1500]  No intake/output data recorded. Physical Exam  Constitutional:       General: She is not in acute distress.      Appearance:
Michael Wray, Palliative care nurse, asked this  to assist pt with completing an AD/LW. This  provided the document for pt yesterday. Also discussed the document with pt, and pt's family who were present. Pt named her decision makers. She named her son Carey Lance as primary decision maker, and she named her two sons Dena Bonilla and Paty Guerrero as secondary/alternate decision makers. Pt also made advance care planning decisions. Pt initialed and signed the document and this  witnessed and then notarized the document. Also provided spiritual care with sustaining presence, nurtured hope, and prayer. Pt expressed gratitude for all spiritual care. Original and copies were given to pt, a copy was placed in pt's soft chart, and a copy was emailed to Antonia Ng to be scanned to pt's EMR.        Electronically signed by Chhaya Gleason on 5/3/2023 at 10:14 AM
Occupational Therapy Initial Assessment  Date: 2023   Patient Name: Mark Blount  MRN: [id number]     : 1932    Date of Service: 2023    Discharge Recommendations:  Home with assist PRN  OT Equipment Recommendations  Equipment Needed: No    Assessment   Assessment: Evaluation completed and tx initiated. No overt barriers for home with family assist.  No DME needed  Treatment Diagnosis: L1 compression fx s/p kyphoplasty  REQUIRES OT FOLLOW-UP: Yes  Activity Tolerance  Activity Tolerance: Patient Tolerated treatment well           Patient Diagnosis(es): The primary encounter diagnosis was KAZ (acute kidney injury) ([facility] [address] A diagnosis of Compression fracture of L4 lumbar vertebra, closed, initial encounter [facility]) was also pertinent to this visit. has a past medical history of Back pain, Fracture of thoracic vertebra with delayed healing, Osteoarthritis, Osteoporosis, and Palliative care patient. has a past surgical history that includes Kyphosis surgery (N/A, 2016); back surgery; Kyphosis surgery (N/A, 2016); hc inject other perphrl nerv (Right, 2017); Total hip arthroplasty (Right, 2017); and Spine surgery (N/A, 5/3/2023).     Treatment Diagnosis: L1 compression fx s/p kyphoplasty      Restrictions  Restrictions/Precautions  Restrictions/Precautions: Fall Risk, Surgical Protocols  Required Braces or Orthoses?: Yes (LSO, nurse Adam Curtis checking to see if pt still has to wear brace after kyphoplasty)  Required Braces or Orthoses  Spinal: Lumbar Corset  Spinal Other: LSO    Subjective   General  Chart Reviewed: Yes  Patient assessed for rehabilitation services?: Yes  Family / Caregiver Present: Yes  Pre Treatment Pain Screening  Pain at present: 0  Scale Used: Numeric Score  Intervention List: Patient able to continue with treatment  Comments / Details: no complaint of pain during activity  Oxygen Therapy  O2 Device: None (Room air)    Social/Functional History  Social/Functional
PHARMACY NOTE  Esme Gunn was ordered fish oil. Per the [facility] Formulary Committee, this medication is non-formulary and not stocked by pharmacy. It has been discontinued. The medication can be reordered at discharge.      Electronically signed by Den Cannon [facility] on 5/1/2023 at 9:44 PM
Patient pink & dry, A&O. Denies pain. Dressing to back C/D/I.  VSS. Report called.
Physical Therapy  Facility/Department: [facility] 4 ONCOLOGY UNIT  Physical Therapy Initial Assessment    Name: Sneha Caraballo  : 1932  MRN: [id number]  Date of Service: 2023    Discharge Recommendations:  Continue to assess pending progress, 24 hour supervision or assist, Patient would benefit from continued therapy after discharge          Patient Diagnosis(es): The primary encounter diagnosis was KAZ (acute kidney injury) ([facility] [address] A diagnosis of Compression fracture of L4 lumbar vertebra, closed, initial encounter [facility]) was also pertinent to this visit. Past Medical History:  has a past medical history of Back pain, Fracture of thoracic vertebra with delayed healing, Osteoarthritis, Osteoporosis, and Palliative care patient. Past Surgical History:  has a past surgical history that includes Kyphosis surgery (N/A, 2016); back surgery; Kyphosis surgery (N/A, 2016); hc inject other perphrl nerv (Right, 2017); Total hip arthroplasty (Right, 2017); and Spine surgery (N/A, 5/3/2023). Assessment   Body Structures, Functions, Activity Limitations Requiring Skilled Therapeutic Intervention: Decreased functional mobility ; Decreased balance;Decreased posture;Decreased safe awareness;Decreased strength;Decreased ROM  Assessment: Pt. will benefit from cont. PT to decrease impairments. Pt. a slight fall risk due to recent surgery. Pt. encouraged to have A when out of bed. Pt. would benefit from 24 hr care initially for 1st few days and she likely will progress to only needing PRN A. States family lives all around her. Recommend she use her RW until balance improves. Pt. slightly impulsive initially, but she is usually independent with mobility prior to admission and used to doing everything for herself. Will put pt on PT list in case she does not d/c home today to provide opportunities for her to amb. in the hallway.  Spoke with RN, Prem Randhawa about contacting Dr. Melva White to see if pt needed to wear LSO after
Physician Progress Note      PATIENT:               Aurelia Langley  CSN #:                  [id number]  :                       1932  ADMIT DATE:       2023 11:44 AM  [address] [place] DATE:    PROVIDER #:        Angela Valenzuela MD          QUERY TEXT:    Pt admitted with back pain. Pt noted to have osteopenia. If possible, please   document in progress notes and discharge summary if you are evaluating and/or   treating any of the following: The medical record reflects the following:  Risk Factors: Previous T 11 and T12 kyphoplasty 2016, Age, Osteoporosis  Clinical Indicators: Back Pain, L1 fx CT - Profound osteopenia, status post   kyphoplasty of T11 and T12. There are  additional age-indeterminate biconcave compression fractures of L1, L2, L3,   and possibly L4. OP note 5/3  dx Pathologic  compression fracture of the L1 vertebra due to age-related osteoporosis  ? Treatment: Neurosurgery consult, Kyphoplasty L1, LSO brace, CT,    Thank you  Mireya Olivas RN, BSN, [facility]  [phone]  Options provided:  -- Pathological L1 fracture due to osteopenia  -- Pathological L1 fracture due to osteopenia following fall which would not   usually break a normal, healthy bone  -- Osteoporotic L1  Fracture  -- Osteoporotic L1 fracture following fall which would not usually break a   normal, healthy bone  -- Other - I will add my own diagnosis  -- Disagree - Not applicable / Not valid  -- Disagree - Clinically unable to determine / Unknown  -- Refer to Clinical Documentation Reviewer    PROVIDER RESPONSE TEXT:    This patient has a pathological L1 fracture due to osteopenia.     Query created by: Estrella Page on 2023 10:52 AM      Electronically signed by:  Angela Valenzuela MD 2023 12:16 PM
Physician Progress Note      PATIENT:               Lenin Knight  CSN #:                  [id number]  :                       1932  ADMIT DATE:       2023 11:44 AM  DISCH DATE:        2023 4:22 PM  RESPONDING  PROVIDER #:        Nury Duong MD          QUERY TEXT:    Patient admitted with back pain. Noted documentation of KAZ in ED impression   dated 23 and renal insufficiency in pnotes dated 5/3/23. If possible, please document in progress notes and discharge summary if you   are evaluating and /or treating any of the following: The medical record reflects the following:  Risk Factors: elevated BP  Clinical Indicators: crea 1.2 (), 1.1 (), 1.0 (5/3), 1.1 ();   documented per pnotes that unclear baseline, no labs since   Treatment: IVF @ 100 mll/hr, serial creat levels    Thank you,  Kelsy Paul, [facility]  [phone]  Options provided:  -- Renal insufficiency confirmed  -- Other - I will add my own diagnosis  -- Disagree - Not applicable / Not valid  -- Disagree - Clinically unable to determine / Unknown  -- Refer to Clinical Documentation Reviewer    PROVIDER RESPONSE TEXT:    After study, renal insufficiency confirmed.     Query created by: John Dodd on 2023 12:44 PM      Electronically signed by:  Nury Duong MD 2023 1:01 PM
This  attempted to meet with pt to provide AD/LW document. Pt was out of the room for an MRI, according to her brother. This  left the document with pt's brother for pt to look over and discuss with her family. Pt's brother expressed gratitude.      Electronically signed by Celsa Kohler on 5/2/2023 at 3:40 PM
SpO2: 99%       Constitutional: The patient appears well-developed and well-nourished. Eyes - conjunctiva normal.  Conjugate Gaze  Ear, nose, throat - No scars, masses, or lesions over external nose or ears, no atrophy of tongue  Neck-symmetric, no masses noted, no jugular vein distension  Respiration- chest wall appears symmetric, good expansion, normal effort without use of accessory muscles  Musculoskeletal - no significant wasting of muscles noted, no bony deformities  Extremities-no clubbing, cyanosis or edema  Skin - warm, dry, and intact. No rash, erythema, or pallor. Psychiatric - mood, affect, and behavior appear normal.     Neurologic Examination  Awake, Alert and oriented x 4  Normal speech pattern, following commands    Motor:  RIGHT: iliopsoas 5/5    knee flexor 5/5    knee extension 5/5    EHL/dorsiflexion 5/5    plantar flexion 5/5    LEFT:    iliopsoas 5/5    knee flexor 5/5    knee extension 5/5    EHL/dorsiflexion 5/5    plantar flexion 5/5    No deficits to light touch or pinprick sensation  Reflexes are 1+ bilateral patellar    Lumbar Dressing: C/D/I    DATA:  Nursing/pcp notes, imaging,labs and vitals reviewed.      PT,OT and/or speech notes reviewed    Lab Results   Component Value Date    WBC 7.6 05/04/2023    HGB 10.2 (L) 05/04/2023    HCT 33.2 (L) 05/04/2023    MCV 78.5 (L) 05/04/2023     05/04/2023     Lab Results   Component Value Date     05/04/2023    K 4.4 05/04/2023     05/04/2023    CO2 21 (L) 05/04/2023    BUN 17 05/04/2023    CREATININE 1.1 (H) 05/04/2023    GLUCOSE 145 (H) 05/04/2023    CALCIUM 9.3 05/04/2023    PROT 7.9 05/01/2023    LABALBU 4.5 05/01/2023    BILITOT 0.3 05/01/2023    ALKPHOS 93 05/01/2023    AST 35 (H) 05/01/2023    ALT 18 05/01/2023    LABGLOM 47 (A) 05/04/2023    GLOB 2.8 11/12/2016     Lab Results   Component Value Date    INR 1.01 06/28/2017    INR 0.97 11/10/2016    INR 1.06 09/10/2016    PROTIME 13.2 06/28/2017    PROTIME 12.9
LABGLOM 53 (A) 05/03/2023    GLOB 2.8 11/12/2016     Lab Results   Component Value Date    INR 1.01 06/28/2017    INR 0.97 11/10/2016    INR 1.06 09/10/2016    PROTIME 13.2 06/28/2017    PROTIME 12.9 11/10/2016    PROTIME 13.8 09/10/2016     Reading Physician Reading Date Result Priority   Thelma Gar MD  [phone] 5/1/2023      Narrative & Impression  Examinations: 1.CT of the thoracic spine without IV contrast   2. CT of the lumbar spine without IV contrast  Clinical history: Pain  Comparison: None  Technique: Helical CT imaging of the thoracic and lumbar spine was performed  without IV contrast administration. Images were reformatted in the axial,  sagittal, and coronal planes. Findings:  Bones are severely demineralized. Patient is status post kyphoplasty of  compression fractures of the T11 and T12 vertebral bodies. There are additional  age-indeterminate biconcave compression fractures of L1, 2,l3, and possibly L4. Acute fracture plane is not definitely identified there is accentuation of  thoracic kyphosis. There are degenerative changes within the spine. Paraspinal  soft tissues are grossly unremarkable. No acute abnormality is identified within  the visualized intrathoracic and intra-abdominal compartments. Dependent  atelectasis is seen within the left lung. Bilateral renal cysts are seen,  including a right renal cyst which contains a calcified septation. Large hiatal  hernia is present. Calcified granuloma within the right lung apex. IMPRESSION:  Impression:  Profound osteopenia, status post kyphoplasty of T11 and T12. There are  additional age-indeterminate biconcave compression fractures of L1, L2, L3, and  possibly L4. Please correlate clinically for pain/tenderness at these sites. An  acute fracture plane is not definitely identified. If clinically indicated,  further evaluation with MRI may be considered.            Exam Ended: 05/01/23 14:10 CDT Last Resulted: 05/01/23 19:25 CDT            I have

## 2023-05-06 LAB
EKG P AXIS: 65 DEGREES
EKG P-R INTERVAL: 188 MS
EKG Q-T INTERVAL: 370 MS
EKG QRS DURATION: 78 MS
EKG QTC CALCULATION (BAZETT): 401 MS
EKG T AXIS: 26 DEGREES

## 2024-06-15 ENCOUNTER — APPOINTMENT (OUTPATIENT)
Dept: ULTRASOUND IMAGING | Facility: HOSPITAL | Age: 89
End: 2024-06-15
Payer: MEDICARE

## 2024-06-15 ENCOUNTER — HOSPITAL ENCOUNTER (EMERGENCY)
Facility: HOSPITAL | Age: 89
Discharge: HOME | End: 2024-06-15
Attending: INTERNAL MEDICINE
Payer: MEDICARE

## 2024-06-15 VITALS
SYSTOLIC BLOOD PRESSURE: 129 MMHG | DIASTOLIC BLOOD PRESSURE: 72 MMHG | HEART RATE: 74 BPM | OXYGEN SATURATION: 97 % | TEMPERATURE: 98 F | HEIGHT: 60 IN | RESPIRATION RATE: 17 BRPM | BODY MASS INDEX: 29.06 KG/M2 | WEIGHT: 148 LBS

## 2024-06-15 DIAGNOSIS — L03.116 CELLULITIS OF LEFT LOWER EXTREMITY: Primary | ICD-10-CM

## 2024-06-15 PROCEDURE — 93971 EXTREMITY STUDY: CPT

## 2024-06-15 PROCEDURE — 99284 EMERGENCY DEPT VISIT MOD MDM: CPT

## 2024-06-15 PROCEDURE — 93971 EXTREMITY STUDY: CPT | Performed by: SURGERY

## 2024-06-15 RX ORDER — DOXYCYCLINE 100 MG/1
100 CAPSULE ORAL 2 TIMES DAILY
Qty: 14 CAPSULE | Refills: 0 | Status: SHIPPED | OUTPATIENT
Start: 2024-06-15 | End: 2024-06-22

## 2024-06-15 NOTE — ED PROVIDER NOTES
Subjective   History of Present Illness  Patient is a 89-year-old female who presents emergency department with complaints of left leg swelling.  Patient reports that it has been ongoing since Monday.  Left lower extremity is mildly swollen with erythema present.  There are no open areas or drainage present on exam.  Patient was sent from [place] for an ultrasound venous Doppler to rule out DVT.  Patient is not on any anticoagulation.  Patient denies any further symptoms.        Review of Systems   Cardiovascular:  Positive for leg swelling.   Skin:  Positive for color change.   All other systems reviewed and are negative.      History reviewed. No pertinent past medical history.    No Known Allergies    History reviewed. No pertinent surgical history.    History reviewed. No pertinent family history.    Social History     Socioeconomic History    Marital status:    Tobacco Use    Smoking status: Never    Smokeless tobacco: Never           Objective   Physical Exam  Vitals and nursing note reviewed.   Constitutional:       General: She is not in acute distress.     Appearance: Normal appearance. She is normal weight. She is not ill-appearing or toxic-appearing.   HENT:      Head: Normocephalic.   Cardiovascular:      Rate and Rhythm: Normal rate and regular rhythm.      Pulses: Normal pulses.      Heart sounds: Normal heart sounds.   Pulmonary:      Effort: Pulmonary effort is normal.      Breath sounds: Normal breath sounds.   Abdominal:      General: Abdomen is flat. Bowel sounds are normal.      Palpations: Abdomen is soft.   Musculoskeletal:         General: Swelling present. Normal range of motion.      Cervical back: Normal range of motion and neck supple.      Comments: LLE mild swelling with erythema present to left foot up to left shin/calf   Skin:     General: Skin is warm and dry.      Findings: Erythema present.   Neurological:      General: No focal deficit present.      Mental Status: She is  alert and oriented to person, place, and time. Mental status is at baseline.   Psychiatric:         Mood and Affect: Mood normal.         Behavior: Behavior normal.         Thought Content: Thought content normal.         Judgment: Judgment normal.         Procedures           ED Course  ED Course as of 06/15/24 2052   Sat Chepe 15, 2024   1626 Preliminary results of ultrasound venous Doppler negative for DVT. [KR]      ED Course User Index  [KR] Maryuri Espino APRN                                             Medical Decision Making  Patient is a 89-year-old female who presents emergency department with complaints of left leg swelling.  Patient reports that it has been ongoing since Monday.  Left lower extremity is mildly swollen with erythema present.  There are no open areas or drainage present on exam.  Patient was sent from [place] for an ultrasound venous Doppler to rule out DVT.  Patient is not on any anticoagulation.  Patient denies any further symptoms.    Vital signs stable.  Differential diagnoses include DVT, cellulitis, other.  Ultrasound venous Doppler left lower extremity ordered in the ER.  Preliminary results of this revealed no thrombus visualized.  Exam is consistent with cellulitis.  She does have mild swelling with erythema present to her left lower leg.  No open areas or wounds.  Patient was discharged with oral antibiotics and advised to follow with primary care provider as soon as possible to reassess symptoms.  She was advised to return to the ER in approximately 48 hours if symptoms are not improving after oral antibiotics or if she has any new or concerning symptoms.  Patient verbalized understanding of discharge instructions and agreed with them.  Patient discharged in stable condition.    Problems Addressed:  Cellulitis of left lower extremity: complicated acute illness or injury    Amount and/or Complexity of Data Reviewed  Labs: ordered.    Risk  Prescription drug  management.        Final diagnoses:   Cellulitis of left lower extremity       ED Disposition  ED Disposition       ED Disposition   Discharge    Condition   Stable    Comment   --               Josefa Hassan MD  [address] [id number] [phone]    Schedule an appointment as soon as possible for a visit in 1 day      [facility] EMERGENCY DEPARTMENT  [address] [place] [id number] [phone]  Go to   If symptoms worsen         Medication List        New Prescriptions      doxycycline 100 MG capsule  Commonly known as: MONODOX  Take 1 capsule by mouth 2 (Two) Times a Day for 7 days.               Where to Get Your Medications        These medications were sent to [facility] - [place], [place] - [address] [place] [address] [phone] [facility] [phone] [place] [id number]      Phone: [phone]   doxycycline 100 MG capsule            Mrayuri Espino, ALKA  06/15/24 2052

## 2024-06-15 NOTE — DISCHARGE INSTRUCTIONS
Today you are seen in the ER for your symptoms.  Your ultrasound was negative for blood clot.  This is likely cellulitis.  Oral antibiotics have been prescribed.  Please avoid being out in the sun while taking doxycycline.  If symptoms are not improving in approximately 48 hours, you may likely need to return to the ER for IV antibiotics.  Please follow-up with primary care provider soon as possible to reassess symptoms.  Please return the ER for any new or worsening symptoms.

## (undated) DEVICE — PAD,NON-ADHERENT,3X8,STERILE,LF,1/PK: Brand: MEDLINE

## (undated) DEVICE — BAND BAG 36" X 36": Brand: TIDI

## (undated) DEVICE — PACK ANT HIP CDS

## (undated) DEVICE — UNDERGLOVE SURG SZ 8 FNGR THK0.21MIL GRN LTX BEAD CUF

## (undated) DEVICE — GLOVE SURG SZ 8 L12IN FNGR THK13MIL BRN LTX SYN POLYMER W

## (undated) DEVICE — MAJOR BSIN SETUP PK

## (undated) DEVICE — SUTURE VCRL SZ 3-0 L27IN ABSRB UD L26MM SH 1/2 CIR J416H

## (undated) DEVICE — CHLORAPREP 26ML ORANGE

## (undated) DEVICE — SYSTEM SKIN CLSR 22CM DERMBND PRINEO

## (undated) DEVICE — SUTURE VCRL SZ 2-0 L27IN ABSRB UD L26MM SH 1/2 CIR J417H

## (undated) DEVICE — ROUND BUR, MEDIUM, 6.5 MM: Brand: CONMED

## (undated) DEVICE — SUTURE ABSRB BRAID COAT UD CP NO 2 27IN VCRL J195H

## (undated) DEVICE — KIT CARE PATIENT HANA PROFX

## (undated) DEVICE — TIBURON SPLIT SHEET: Brand: CONVERTORS

## (undated) DEVICE — TRAY EPI 25GA L3.5IN 0.75% BIPIVCAIN 8.25% D CONTAIN BPA

## (undated) DEVICE — GLOVE SURG SZ 75 CRM LTX FREE POLYISOPRENE POLYMER BEAD ANTI

## (undated) DEVICE — LIGHT SUCT UNTETHERED SCINTILLANT

## (undated) DEVICE — PREP IM ENCHANCED TOTAL HIP BONE                                    PREPARATION KIT: Brand: PREP-IM

## (undated) DEVICE — STERILE POLYISOPRENE POWDER-FREE SURGICAL GLOVES: Brand: PROTEXIS

## (undated) DEVICE — SUTURE VCRL SZ 0 L27IN ABSRB UD L36MM CT-1 1/2 CIR J260H

## (undated) DEVICE — DRESSING TRNSPAR W5XL4.5IN FLM SHT SEMIPERMEABLE WIND

## (undated) DEVICE — GOWN,PRECEPT,XLNG/XXLARGE,STRL: Brand: MEDLINE

## (undated) DEVICE — NON-WOVEN ADHESIVE WOUND DRESSING: Brand: PRIMAPORE ADHESIVE DRESSING 30*10CM

## (undated) DEVICE — TUBE ET 7MM NSL ORAL BASIC CUF INTMED MURPHY EYE RADPQ MRK

## (undated) DEVICE — SHEET,DRAPE,53X77,STERILE: Brand: MEDLINE

## (undated) DEVICE — SOLUTION IV 250ML 0.9% SOD CHL PH 5 INJ USP VIAFLX PLAS

## (undated) DEVICE — SUTURE VCRL SZ 2-0 L36IN ABSRB UD L36MM CT-1 1/2 CIR J945H

## (undated) DEVICE — 3M™ IOBAN™ 2 ANTIMICROBIAL INCISE DRAPE 6650EZ: Brand: IOBAN™ 2

## (undated) DEVICE — SHEET,T,THYROID,STERILE: Brand: MEDLINE

## (undated) DEVICE — MEDIA CONTRAST INJ VISAPAQUE 50ML 320MG

## (undated) DEVICE — SPONGE GZ L3FTXW2IN COT VAG XR DTECT 4 PLY MESH PK RL

## (undated) DEVICE — MCLASS OSCILLATING SAW BLADE 19 X 1.27 (0.050") X 90 MM: Brand: MCLASS

## (undated) DEVICE — SUTURE VCRL SZ 3-0 L18IN ABSRB UD L26MM SH 1/2 CIR J864D

## (undated) DEVICE — COVER LT HNDL CAM BLU DISP W/ SURG CTRL

## (undated) DEVICE — SPK10281 JACKSON TABLE KIT: Brand: SPK10281 JACKSON TABLE KIT

## (undated) DEVICE — GOWN,PREVENTION PLUS,L,ST,24/CS: Brand: MEDLINE

## (undated) DEVICE — Device: Brand: POWER-FLO®

## (undated) DEVICE — Z INACTIVE USE 2660664 SOLUTION IRRIG 3000ML 0.9% SOD CHL USP UROMATIC PLAS CONT

## (undated) DEVICE — GLOVE SURG SZ 75 L12IN FNGR THK87MIL DK GRN LTX FREE ISOLEX

## (undated) DEVICE — IBT KIT KPX203PB-A FF X2 20/3 1 STP: Brand: KYPHON® ONE-STEP™ SYSTEM, TROCAR & DIAMOND AND BFD

## (undated) DEVICE — BIPOLAR SEALER 23-112-1 AQM 6.0: Brand: AQUAMANTYS ®

## (undated) DEVICE — BLADE LARYNSCP HNDL MAC 3 DISP CURAVIEW LED

## (undated) DEVICE — SOLUTION IV IRRIG POUR BRL 0.9% SODIUM CHL 2F7124